# Patient Record
Sex: MALE | Race: WHITE | NOT HISPANIC OR LATINO
[De-identification: names, ages, dates, MRNs, and addresses within clinical notes are randomized per-mention and may not be internally consistent; named-entity substitution may affect disease eponyms.]

---

## 2017-08-28 ENCOUNTER — APPOINTMENT (OUTPATIENT)
Dept: DERMATOLOGY | Facility: CLINIC | Age: 22
End: 2017-08-28
Payer: MEDICARE

## 2017-08-28 VITALS — DIASTOLIC BLOOD PRESSURE: 70 MMHG | SYSTOLIC BLOOD PRESSURE: 118 MMHG

## 2017-08-28 DIAGNOSIS — L70.0 ACNE VULGARIS: ICD-10-CM

## 2017-08-28 DIAGNOSIS — Z87.09 PERSONAL HISTORY OF OTHER DISEASES OF THE RESPIRATORY SYSTEM: ICD-10-CM

## 2017-08-28 DIAGNOSIS — Z87.898 PERSONAL HISTORY OF OTHER SPECIFIED CONDITIONS: ICD-10-CM

## 2017-08-28 DIAGNOSIS — D22.9 MELANOCYTIC NEVI, UNSPECIFIED: ICD-10-CM

## 2017-08-28 PROCEDURE — 99203 OFFICE O/P NEW LOW 30 MIN: CPT | Mod: GC

## 2018-08-20 ENCOUNTER — APPOINTMENT (OUTPATIENT)
Dept: DERMATOLOGY | Facility: CLINIC | Age: 23
End: 2018-08-20
Payer: COMMERCIAL

## 2018-08-20 VITALS — SYSTOLIC BLOOD PRESSURE: 128 MMHG | DIASTOLIC BLOOD PRESSURE: 72 MMHG

## 2018-08-20 DIAGNOSIS — B35.3 TINEA PEDIS: ICD-10-CM

## 2018-08-20 DIAGNOSIS — L85.8 OTHER SPECIFIED EPIDERMAL THICKENING: ICD-10-CM

## 2018-08-20 DIAGNOSIS — L71.9 ROSACEA, UNSPECIFIED: ICD-10-CM

## 2018-08-20 DIAGNOSIS — L81.9 DISORDER OF PIGMENTATION, UNSPECIFIED: ICD-10-CM

## 2018-08-20 PROCEDURE — 99214 OFFICE O/P EST MOD 30 MIN: CPT

## 2018-12-27 ENCOUNTER — APPOINTMENT (OUTPATIENT)
Dept: DERMATOLOGY | Facility: CLINIC | Age: 23
End: 2018-12-27
Payer: COMMERCIAL

## 2018-12-27 DIAGNOSIS — L30.9 DERMATITIS, UNSPECIFIED: ICD-10-CM

## 2018-12-27 PROCEDURE — 99213 OFFICE O/P EST LOW 20 MIN: CPT

## 2019-01-05 ENCOUNTER — TRANSCRIPTION ENCOUNTER (OUTPATIENT)
Age: 24
End: 2019-01-05

## 2019-02-05 ENCOUNTER — RX RENEWAL (OUTPATIENT)
Age: 24
End: 2019-02-05

## 2019-02-21 ENCOUNTER — APPOINTMENT (OUTPATIENT)
Dept: DERMATOLOGY | Facility: CLINIC | Age: 24
End: 2019-02-21

## 2019-02-25 ENCOUNTER — APPOINTMENT (OUTPATIENT)
Dept: CT IMAGING | Facility: IMAGING CENTER | Age: 24
End: 2019-02-25
Payer: COMMERCIAL

## 2019-02-25 ENCOUNTER — OUTPATIENT (OUTPATIENT)
Dept: OUTPATIENT SERVICES | Facility: HOSPITAL | Age: 24
LOS: 1 days | End: 2019-02-25
Payer: COMMERCIAL

## 2019-02-25 DIAGNOSIS — J34.2 DEVIATED NASAL SEPTUM: ICD-10-CM

## 2019-02-25 DIAGNOSIS — Z00.8 ENCOUNTER FOR OTHER GENERAL EXAMINATION: ICD-10-CM

## 2019-02-25 PROCEDURE — 70486 CT MAXILLOFACIAL W/O DYE: CPT | Mod: 26

## 2019-02-25 PROCEDURE — 70486 CT MAXILLOFACIAL W/O DYE: CPT

## 2019-04-01 ENCOUNTER — APPOINTMENT (OUTPATIENT)
Dept: MRI IMAGING | Facility: IMAGING CENTER | Age: 24
End: 2019-04-01
Payer: COMMERCIAL

## 2019-04-01 ENCOUNTER — OUTPATIENT (OUTPATIENT)
Dept: OUTPATIENT SERVICES | Facility: HOSPITAL | Age: 24
LOS: 1 days | End: 2019-04-01
Payer: COMMERCIAL

## 2019-04-01 DIAGNOSIS — J32.1 CHRONIC FRONTAL SINUSITIS: ICD-10-CM

## 2019-04-01 PROCEDURE — A9585: CPT

## 2019-04-01 PROCEDURE — 70553 MRI BRAIN STEM W/O & W/DYE: CPT

## 2019-04-01 PROCEDURE — 70553 MRI BRAIN STEM W/O & W/DYE: CPT | Mod: 26

## 2019-07-24 ENCOUNTER — INBOUND DOCUMENT (OUTPATIENT)
Age: 24
End: 2019-07-24

## 2019-07-24 ENCOUNTER — APPOINTMENT (OUTPATIENT)
Dept: OTOLARYNGOLOGY | Facility: CLINIC | Age: 24
End: 2019-07-24
Payer: COMMERCIAL

## 2019-07-24 VITALS
DIASTOLIC BLOOD PRESSURE: 90 MMHG | HEART RATE: 66 BPM | SYSTOLIC BLOOD PRESSURE: 131 MMHG | HEIGHT: 69 IN | BODY MASS INDEX: 23.7 KG/M2 | WEIGHT: 160 LBS

## 2019-07-24 PROCEDURE — 92567 TYMPANOMETRY: CPT

## 2019-07-24 PROCEDURE — 99204 OFFICE O/P NEW MOD 45 MIN: CPT

## 2019-07-24 PROCEDURE — 92588 EVOKED AUDITORY TST COMPLETE: CPT

## 2019-07-24 PROCEDURE — 92557 COMPREHENSIVE HEARING TEST: CPT

## 2019-07-25 NOTE — REVIEW OF SYSTEMS
[Ear Pain] : ear pain [Ear Itch] : ear itch [Hearing Loss] : hearing loss [Vertigo] : vertigo [Dizziness] : dizziness [Lightheadedness] : lightheadedness [Recurrent Sinus Infections] : recurrent sinus infections [Nasal Congestion] : nasal congestion [Sinus Pain] : sinus pain [Problem Snoring] : problem snoring [Sinus Pressure] : sinus pressure [Heartburn] : heartburn [Anxiety] : anxiety [Negative] : Heme/Lymph [FreeTextEntry1] : headache, fatigue

## 2019-07-25 NOTE — CONSULT LETTER
[FreeTextEntry2] : James Cool MD [FreeTextEntry1] : Dear James,\par \par Thank you very much for your consultation request regarding Charles Burrows.  As you know, he is a pleasant 23-year-old gentleman with a history of fluctuating symmetric high-frequency sensorineural hearing loss.  He was tried on 7 days of high-dose prednisone, but the hearing loss has been unchanged. \par \par Otoscopic exam today is normal.  I reviewed multiple audiograms.  He first was found to have documented hearing loss in 2016, which improved after 7-8 months.  Audiograms from full 2018 and March 2019 showed normal hearing, but beginning in June he noticed new onset ear fullness and tinnitus, and audiogram again demonstrated a bilateral symmetric high-frequency hearing loss.  Recent brain MRI showed no retrocochlear pathology, and he is awaiting a dedicated IAC study.   \par \par I agree with your recommendations for steroids based on a possible diagnosis of autoimmune anterior disease.  We plan to reinitiate high-dose prednisone for 2 weeks and will recheck his hearing at the end of this time.  If there has been improvement I will plan to continue at high-dose for one month, but if there is no change he will begin another taper.  We briefly discussed that he could potentially be a candidate for an immune modulator such as anakinra should there be no response to steroids.  \par \par Thank you once again for the opportunity to participate in your patient's care, and I will continue to keep you updated as to his progress.\par \par Best regards,\par \par Gurmeet Gupta MD\par Otology/Neurotology\par Department of Otolaryngology\par [facility]\par \par \par

## 2019-07-25 NOTE — REASON FOR VISIT
[Initial Consultation] : an initial consultation for [FreeTextEntry2] : hearing loss, ringing in ears

## 2019-07-25 NOTE — DATA REVIEWED
[de-identified] : I personally reviewed the patient's audiogram from today, which shows bilateral HF SNHL with similar thresholds to most recent study from Dr. Cool's office.  Prior audiograms from early 2019 and 2018 show normal hearing, and audiogram from 2015 shows bilateral HF SNHL.\par  [de-identified] : I personally reviewed MRI images and the report, which shows no obvious IAC/retrocochlear pathology, although this was not a dedicated IAC study.\par

## 2019-07-25 NOTE — HISTORY OF PRESENT ILLNESS
[de-identified] : 23M with hearing loss and tinnitus that began late June 2019.  Hearing loss constant, associated fullness.  Took steroids which subjectively improved hearing but fullness persistent. Has history of sinusitis.  Had ear fullness in 2016 with documented hearing loss that improved 7-8 months.  No prior history of recurrent ear infections or ear surgeries.

## 2019-07-29 ENCOUNTER — OUTPATIENT (OUTPATIENT)
Dept: OUTPATIENT SERVICES | Facility: HOSPITAL | Age: 24
LOS: 1 days | End: 2019-07-29
Payer: COMMERCIAL

## 2019-07-29 ENCOUNTER — APPOINTMENT (OUTPATIENT)
Dept: MRI IMAGING | Facility: IMAGING CENTER | Age: 24
End: 2019-07-29
Payer: COMMERCIAL

## 2019-07-29 DIAGNOSIS — J32.1 CHRONIC FRONTAL SINUSITIS: ICD-10-CM

## 2019-07-29 DIAGNOSIS — H91.20 SUDDEN IDIOPATHIC HEARING LOSS, UNSPECIFIED EAR: ICD-10-CM

## 2019-07-29 PROCEDURE — A9585: CPT

## 2019-07-29 PROCEDURE — 70553 MRI BRAIN STEM W/O & W/DYE: CPT

## 2019-07-29 PROCEDURE — 70553 MRI BRAIN STEM W/O & W/DYE: CPT | Mod: 26

## 2019-08-02 ENCOUNTER — APPOINTMENT (OUTPATIENT)
Dept: OTOLARYNGOLOGY | Facility: CLINIC | Age: 24
End: 2019-08-02
Payer: COMMERCIAL

## 2019-08-02 VITALS
SYSTOLIC BLOOD PRESSURE: 135 MMHG | WEIGHT: 160 LBS | DIASTOLIC BLOOD PRESSURE: 78 MMHG | BODY MASS INDEX: 23.7 KG/M2 | HEIGHT: 69 IN | HEART RATE: 101 BPM

## 2019-08-02 PROCEDURE — 99213 OFFICE O/P EST LOW 20 MIN: CPT | Mod: 25

## 2019-08-02 PROCEDURE — 92567 TYMPANOMETRY: CPT

## 2019-08-02 PROCEDURE — 92557 COMPREHENSIVE HEARING TEST: CPT

## 2019-08-02 RX ORDER — FLUTICASONE PROPIONATE 50 UG/1
50 SPRAY, METERED NASAL
Qty: 16 | Refills: 0 | Status: DISCONTINUED | COMMUNITY
Start: 2017-07-24 | End: 2019-08-02

## 2019-08-02 NOTE — HISTORY OF PRESENT ILLNESS
[de-identified] : 23 year old male follow up for hearing loss and tinnitus. States some improvement since last visit but reports left ear feeling clogged this morning and with mild pressure that has since resolved. Reports right ear tinnitus continues. Denies otalgia, otorrhea, recent ear infections, dizziness, vertigo. Reports mild headaches daily lasting a few hours that resolves on its own. Reports still taking prednisone as prescribed. \par \par

## 2019-08-02 NOTE — DATA REVIEWED
[de-identified] : I personally reviewed the patient's audiogram, which shows stable b/l downsloping HL in the high frequencies.\par

## 2019-08-02 NOTE — REASON FOR VISIT
[Subsequent Evaluation] : a subsequent evaluation for [FreeTextEntry2] : follow up for hearing loss and tinnitus

## 2019-08-07 ENCOUNTER — APPOINTMENT (OUTPATIENT)
Dept: CT IMAGING | Facility: CLINIC | Age: 24
End: 2019-08-07
Payer: COMMERCIAL

## 2019-08-07 ENCOUNTER — OUTPATIENT (OUTPATIENT)
Dept: OUTPATIENT SERVICES | Facility: HOSPITAL | Age: 24
LOS: 1 days | End: 2019-08-07
Payer: COMMERCIAL

## 2019-08-07 ENCOUNTER — APPOINTMENT (OUTPATIENT)
Dept: OTOLARYNGOLOGY | Facility: CLINIC | Age: 24
End: 2019-08-07
Payer: COMMERCIAL

## 2019-08-07 DIAGNOSIS — Z00.8 ENCOUNTER FOR OTHER GENERAL EXAMINATION: ICD-10-CM

## 2019-08-07 PROCEDURE — 74176 CT ABD & PELVIS W/O CONTRAST: CPT | Mod: 26

## 2019-08-07 PROCEDURE — 92557 COMPREHENSIVE HEARING TEST: CPT

## 2019-08-07 PROCEDURE — 74176 CT ABD & PELVIS W/O CONTRAST: CPT

## 2019-08-07 PROCEDURE — 92567 TYMPANOMETRY: CPT

## 2019-08-07 PROCEDURE — 99213 OFFICE O/P EST LOW 20 MIN: CPT | Mod: 25

## 2019-08-07 NOTE — HISTORY OF PRESENT ILLNESS
[de-identified] : reports hearing loss subjectively improved since last visit, no side effects from steroids.  Tinnitus remains intermittent.  Does note today that he was told he had high frequency hearing loss in childhood.  Also states mother had hearing loss in 30s for which she wore hearing aids, and grandmother had Meniere's disease.

## 2019-08-07 NOTE — CONSULT LETTER
[FreeTextEntry2] : James Cool MD [FreeTextEntry1] : Dear James,\par \par Charles presents for followup.  As you know, he is a very pleasant 23-year-old gentleman with bilateral high-frequency hearing loss that appears fluctuating based on prior audiograms.  Since completion of his most recent two-week course of steroids, he has not noticed significant hearing change, and his newest audiogram today again shows a stable high-frequency bilateral sensorineural hearing loss.  Recent MRI showed no retrocochlear pathology.\par \par While the apparent fluctuations in his bilateral hearing loss do suggest the possibility of autoimmune inner ear disease, the lack of response to steroids and the fact that the hearing loss is symmetric would argue against this.  Charles also notes that he was told as a child that he had high-frequency hearing loss, which would further argue against a diagnosis of AIED.  I asked that he attempt to obtain older medical records from his pediatrician documenting his hearing status before we make a decision regarding the possible option of immunosuppressive therapy.  At this point I would not be inclined to recommend immunosuppressive treatment for him unless we see further evidence of hearing fluctuation moving forward.  For now he will follow up in 2 months.\par \par Thank you once again for the opportunity to participate in your patient's care, and I will continue to keep you updated as to his progress.\par \par Best regards,\par \par Gurmeet Gupta MD\par Otology/Neurotology\par Department of Otolaryngology\par [facility]

## 2019-08-07 NOTE — DATA REVIEWED
[de-identified] : I personally reviewed the patient's audiogram, which again shows a stable b/l HF SNHL.\par

## 2019-08-08 ENCOUNTER — APPOINTMENT (OUTPATIENT)
Dept: UROLOGY | Facility: CLINIC | Age: 24
End: 2019-08-08
Payer: COMMERCIAL

## 2019-08-08 DIAGNOSIS — R31.29 OTHER MICROSCOPIC HEMATURIA: ICD-10-CM

## 2019-08-08 PROCEDURE — 99203 OFFICE O/P NEW LOW 30 MIN: CPT

## 2019-08-08 NOTE — ASSESSMENT
[FreeTextEntry1] : ? passed stone ? GI issue.  Advised increased po fluids analgesics prn. Will send urine for microscopic eval / cytology

## 2019-08-08 NOTE — REVIEW OF SYSTEMS
[Loss Of Hearing] : hearing loss [Abdominal Pain] : abdominal pain [Negative] : Heme/Lymph [Vomiting] : no vomiting

## 2019-08-08 NOTE — PHYSICAL EXAM
[General Appearance - Well Nourished] : well nourished [General Appearance - Well Developed] : well developed [General Appearance - In No Acute Distress] : no acute distress [Abdomen Soft] : soft [Costovertebral Angle Tenderness] : no ~M costovertebral angle tenderness [Penis Abnormality] : normal circumcised penis [Urinary Bladder Findings] : the bladder was normal on palpation [Testes Tenderness] : no tenderness of the testes [Testes Mass (___cm)] : there were no testicular masses [Oriented To Time, Place, And Person] : oriented to person, place, and time [FreeTextEntry1] : mild llq tend, no rebou nd or guarding

## 2019-08-08 NOTE — HISTORY OF PRESENT ILLNESS
[Hematuria - Gross] : gross hematuria [Abdominal Pain] : abdominal pain [9] : 9 [Lower Abdomen] : lower abdomen [Sudden] : sudden [Intermittent] : intermittently [___ Minute(s)] : last for [unfilled] minute(s) [Daily] : occur daily [Severe] : severe [Improving] : improving [None] : No relieving factors are noted [FreeTextEntry1] : 3-4 day hx of LLQ pain, sharp stabbing.  Intermittent, No n/v, No f/c No gross hematuria.  Saw pmd had ct and u/a.

## 2019-08-09 LAB
APPEARANCE: ABNORMAL
BACTERIA: NEGATIVE
BILIRUBIN URINE: NEGATIVE
BLOOD URINE: NEGATIVE
COLOR: YELLOW
GLUCOSE QUALITATIVE U: NEGATIVE
HYALINE CASTS: 5 /LPF
KETONES URINE: NEGATIVE
LEUKOCYTE ESTERASE URINE: NEGATIVE
MICROSCOPIC-UA: NORMAL
NITRITE URINE: NEGATIVE
PH URINE: 6.5
PROTEIN URINE: ABNORMAL
RED BLOOD CELLS URINE: 5 /HPF
SPECIFIC GRAVITY URINE: 1.02
SQUAMOUS EPITHELIAL CELLS: 13 /HPF
URINE COMMENTS: NORMAL
UROBILINOGEN URINE: NORMAL
WHITE BLOOD CELLS URINE: 6 /HPF

## 2019-08-12 LAB — URINE CYTOLOGY: NORMAL

## 2019-09-12 ENCOUNTER — APPOINTMENT (OUTPATIENT)
Dept: OTOLARYNGOLOGY | Facility: CLINIC | Age: 24
End: 2019-09-12
Payer: COMMERCIAL

## 2019-09-12 VITALS
DIASTOLIC BLOOD PRESSURE: 85 MMHG | BODY MASS INDEX: 23.7 KG/M2 | HEART RATE: 89 BPM | WEIGHT: 160 LBS | SYSTOLIC BLOOD PRESSURE: 128 MMHG | HEIGHT: 69 IN

## 2019-09-12 PROCEDURE — 92567 TYMPANOMETRY: CPT

## 2019-09-12 PROCEDURE — 92557 COMPREHENSIVE HEARING TEST: CPT

## 2019-09-12 PROCEDURE — 99213 OFFICE O/P EST LOW 20 MIN: CPT | Mod: 25

## 2019-09-12 NOTE — REASON FOR VISIT
[Subsequent Evaluation] : a subsequent evaluation for [FreeTextEntry2] : patient states is here to follow up for tinnitus and patient states pressure behind both ears got worst for the past 2 weeks and also both ears feels clogged

## 2019-12-05 ENCOUNTER — APPOINTMENT (OUTPATIENT)
Dept: OTOLARYNGOLOGY | Facility: CLINIC | Age: 24
End: 2019-12-05
Payer: COMMERCIAL

## 2019-12-05 VITALS
DIASTOLIC BLOOD PRESSURE: 79 MMHG | HEIGHT: 69 IN | WEIGHT: 160 LBS | SYSTOLIC BLOOD PRESSURE: 133 MMHG | HEART RATE: 100 BPM | BODY MASS INDEX: 23.7 KG/M2

## 2019-12-05 PROCEDURE — 92557 COMPREHENSIVE HEARING TEST: CPT

## 2019-12-05 PROCEDURE — 31231 NASAL ENDOSCOPY DX: CPT

## 2019-12-05 PROCEDURE — 99214 OFFICE O/P EST MOD 30 MIN: CPT | Mod: 25

## 2019-12-05 PROCEDURE — 92567 TYMPANOMETRY: CPT

## 2019-12-05 NOTE — HISTORY OF PRESENT ILLNESS
[de-identified] : 23 y/o male with longstanding history of recurrent episodes of sinus pressure, forehead, cheeks, in between eyes and nasal congestion L>R. He notes a sinus infection every 2-3 months- sometimes treated with oral abx but not always- has had 3-4 courses of oral abx within the past 12 months for sinus infections. He notes sinus pressure continues in between infections but has periods where it completely resolves. He's currently on Flonase daily for the past 3-4 months. Prior allergy testing was negative. \par + runny nose\par \par Also with 5-6 month history of bilat. ear blockage and pressure which comes and goes but is very bothersome. Has been followed by Dr. Gupta for this and for hx of SNHL. Was treated with Prednisone course for possible autoimmune hearing loss which helped with blockage/pressure but not durable. Also given clonazepam for tinnitus which helps.\par + tone tinnitus AD x 2-3 months\par + pain AU- when ears full/blocked\par No drainage AU or vertigo.

## 2019-12-05 NOTE — PROCEDURE
[FreeTextEntry6] : Procedure performed: Nasal Endoscopy- Diagnostic\par Pre / post -procedure indication(s): nasal congestion\par Verbal and/or written consent obtained from patient\par Scope #: 19,  flexible fiber optic telescope used\par The scope was introduced in the nasal passage between the middle and inferior turbinates to exam the inferior portion of the middle meatus and the fontanelle, as well as the maxillary ostia.  Next, the scope was passed medically and posteriorly to the middle turbinates to examine the sphenoethmoid recess and the superior turbinate region.\par Upon visualization the finders are as follows:\par Nasal Septum: sigmoidal septal deviation \par B/L: Mucosa: pink and moist, edematous Mucous: scant, Polyp: not seen, Inferior Turbinate, Middle and Superior Turbinate: inferior turbinate hypertrophy Inferior Meatus: narrow, Middle Meatus: narrow, Super Meatus:normal, Sphenoethmoidal Recess: clear.  Eustachian tube clear. \par The patient tolerated the procedure well\par

## 2019-12-05 NOTE — ASSESSMENT
[FreeTextEntry1] : Pt with chronic sinusitis and nasal congestion with is refractory to extensive medical management. On exam, sigmoidal septal deviation, inferior turbinate hypertrophy and significant mucosal edema. \par - CT images reviewed\par - We will proceed with a regimen of decongestants, antibiotics and irrigation to try to break the cycle of inflammation and obstruction. \par - Risks benefits and alternatives of endoscopic sinus surgery with possible image guidance, septoplasty and bilateral inferior turbinate reduction discussed with patient at length. Risks discussed include but were not limited to bleeding, infection, persistent symptoms, scarring, injury to the skull base and brain and CSF leak, injury to orbit, crusting, septal hematoma, septal perforation results in whistling, crusting and bleeding as well as continued nasal obstruction etc. were discussed. 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 Also discussed behavioral techniques and environmental controls for improving function . They will follow up as needed or if hearing gets worse.\par - Discussed pathophysiology of tinnitus and usual prognosis and treatment. Will try masking techniques. If persist and bothersome can try pitch therapy, or acupuncture.\par \par \par I personally saw and examined KAYLEY DONG in detail. I spoke to FRIDA Cerna regarding the assessment and plan of care.  I preformed the procedures and I reviewed the above assessment and plan of care, and agree. I have made changes in changes in the body of the note where appropriate.\par \par \par \par

## 2019-12-05 NOTE — CONSULT LETTER
[FreeTextEntry1] : Dear Dr. CASSANDRA RAO \par I had the pleasure of evaluating your patient KAYLEY DONG  thank you for allowing us to participate in their care. please see full note detailing our visit below.\par If you have any questions, please do not hesitate to call me and I would be happy to discuss further. \par \par Dixon Joseph M.D.\par Attending Physician,  \par Department of Otolaryngology - Head and Neck Surgery\par [facility] \par Office: [phone]\par Fax: [phone]\par

## 2020-01-06 ENCOUNTER — APPOINTMENT (OUTPATIENT)
Dept: OTOLARYNGOLOGY | Facility: CLINIC | Age: 25
End: 2020-01-06
Payer: COMMERCIAL

## 2020-01-06 VITALS
HEART RATE: 77 BPM | WEIGHT: 165 LBS | DIASTOLIC BLOOD PRESSURE: 77 MMHG | BODY MASS INDEX: 24.44 KG/M2 | HEIGHT: 69 IN | SYSTOLIC BLOOD PRESSURE: 131 MMHG

## 2020-01-06 PROCEDURE — 31231 NASAL ENDOSCOPY DX: CPT

## 2020-01-06 PROCEDURE — 99214 OFFICE O/P EST MOD 30 MIN: CPT | Mod: 25

## 2020-01-06 NOTE — HISTORY OF PRESENT ILLNESS
[de-identified] : 25 y/o male with longstanding history of recurrent episodes of sinus pressure, forehead, cheeks, in between eyes and nasal congestion L>R. He notes a sinus infection every 2-3 months- sometimes treated with oral abx but not always- has had 3-4 courses of oral abx within the past 12 months for sinus infections. He notes sinus pressure continues in between infections but has periods where it completely resolves. He's currently on Flonase daily for the past 3-4 months. Prior allergy testing was negative. At last visit, started on sinusitis regimen which he completed with some improvement but not durable. He is currently scheduled for pansinus IG FESS, septoplasty and bilat. turbinate reduction 3/18/20.\par + runny nose\par \par Also with 5-6 month history of bilat. ear blockage and pressure which comes and goes but is very bothersome. Has been followed by Dr. Gupta for this and for hx of SNHL. He notes some improvement in ear pressure while on oral steroids. \par + tone tinnitus AD x 2-3 months\par No drainage AU or vertigo.

## 2020-01-06 NOTE — ASSESSMENT
[FreeTextEntry1] : Pt with chronic sinusitis and nasal congestion with is refractory to extensive medical management. On exam, sigmoidal septal deviation, inferior turbinate hypertrophy and significant mucosal edema. \par - CT images reviewed\par - Will repeat CT sinus as last one was over 1 year ago\par - Risks benefits and alternatives of endoscopic sinus surgery with possible image guidance, septoplasty and bilateral inferior turbinate reduction discussed with patient at length. Risks discussed include but were not limited to bleeding, infection, persistent symptoms, scarring, injury to the skull base and brain and CSF leak, injury to orbit, crusting, septal hematoma, septal perforation results in whistling, crusting and bleeding as well as continued nasal obstruction etc. were discussed. 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 Also discussed behavioral techniques and environmental controls for improving function . They will follow up as needed or if hearing gets worse.\par - Discussed pathophysiology of tinnitus and usual prognosis and treatment. Will try masking techniques. If persist and bothersome can try pitch therapy, or acupuncture.\par \par \par \par \par \par I personally saw and examined KAYLEY DONG in detail. I spoke to FRIDA Cerna regarding the assessment and plan of care.  I preformed the procedures and I reviewed the above assessment and plan of care, and agree. I have made changes in changes in the body of the note where appropriate.\par \par

## 2020-01-06 NOTE — CONSULT LETTER
[FreeTextEntry1] : Dear Dr. CASSANDRA RAO \par I had the pleasure of evaluating your patient KAYLEY DONG  thank you for allowing us to participate in their care. please see full note detailing our visit below.\par If you have any questions, please do not hesitate to call me and I would be happy to discuss further. \par \par Dixon Joseph M.D.\par Attending Physician,  \par Department of Otolaryngology - Head and Neck Surgery\[facility] \par Office: [phone]\par Fax: [phone]\par

## 2020-01-21 ENCOUNTER — RX RENEWAL (OUTPATIENT)
Age: 25
End: 2020-01-21

## 2020-02-09 ENCOUNTER — FORM ENCOUNTER (OUTPATIENT)
Age: 25
End: 2020-02-09

## 2020-02-10 ENCOUNTER — OUTPATIENT (OUTPATIENT)
Dept: OUTPATIENT SERVICES | Facility: HOSPITAL | Age: 25
LOS: 1 days | End: 2020-02-10
Payer: COMMERCIAL

## 2020-02-10 ENCOUNTER — APPOINTMENT (OUTPATIENT)
Dept: CT IMAGING | Facility: IMAGING CENTER | Age: 25
End: 2020-02-10
Payer: COMMERCIAL

## 2020-02-10 DIAGNOSIS — J32.4 CHRONIC PANSINUSITIS: ICD-10-CM

## 2020-02-10 PROCEDURE — 70486 CT MAXILLOFACIAL W/O DYE: CPT | Mod: 26

## 2020-02-10 PROCEDURE — 70486 CT MAXILLOFACIAL W/O DYE: CPT

## 2020-02-13 ENCOUNTER — NON-APPOINTMENT (OUTPATIENT)
Age: 25
End: 2020-02-13

## 2020-03-11 ENCOUNTER — OUTPATIENT (OUTPATIENT)
Dept: OUTPATIENT SERVICES | Facility: HOSPITAL | Age: 25
LOS: 1 days | End: 2020-03-11
Payer: COMMERCIAL

## 2020-03-11 VITALS
OXYGEN SATURATION: 100 % | RESPIRATION RATE: 16 BRPM | WEIGHT: 167.99 LBS | DIASTOLIC BLOOD PRESSURE: 79 MMHG | TEMPERATURE: 98 F | HEART RATE: 74 BPM | HEIGHT: 69 IN | SYSTOLIC BLOOD PRESSURE: 132 MMHG

## 2020-03-11 DIAGNOSIS — J32.4 CHRONIC PANSINUSITIS: ICD-10-CM

## 2020-03-11 DIAGNOSIS — J34.3 HYPERTROPHY OF NASAL TURBINATES: ICD-10-CM

## 2020-03-11 DIAGNOSIS — J34.2 DEVIATED NASAL SEPTUM: ICD-10-CM

## 2020-03-11 DIAGNOSIS — Z01.818 ENCOUNTER FOR OTHER PREPROCEDURAL EXAMINATION: ICD-10-CM

## 2020-03-11 LAB
HCT VFR BLD CALC: 46.2 % — SIGNIFICANT CHANGE UP (ref 39–50)
HGB BLD-MCNC: 15.2 G/DL — SIGNIFICANT CHANGE UP (ref 13–17)
MCHC RBC-ENTMCNC: 29.5 PG — SIGNIFICANT CHANGE UP (ref 27–34)
MCHC RBC-ENTMCNC: 32.9 GM/DL — SIGNIFICANT CHANGE UP (ref 32–36)
MCV RBC AUTO: 89.7 FL — SIGNIFICANT CHANGE UP (ref 80–100)
NRBC # BLD: 0 /100 WBCS — SIGNIFICANT CHANGE UP (ref 0–0)
PLATELET # BLD AUTO: 276 K/UL — SIGNIFICANT CHANGE UP (ref 150–400)
RBC # BLD: 5.15 M/UL — SIGNIFICANT CHANGE UP (ref 4.2–5.8)
RBC # FLD: 12.4 % — SIGNIFICANT CHANGE UP (ref 10.3–14.5)
WBC # BLD: 8.56 K/UL — SIGNIFICANT CHANGE UP (ref 3.8–10.5)
WBC # FLD AUTO: 8.56 K/UL — SIGNIFICANT CHANGE UP (ref 3.8–10.5)

## 2020-03-11 PROCEDURE — 85027 COMPLETE CBC AUTOMATED: CPT

## 2020-03-11 PROCEDURE — G0463: CPT

## 2020-03-11 RX ORDER — LIDOCAINE HCL 20 MG/ML
0.2 VIAL (ML) INJECTION ONCE
Refills: 0 | Status: DISCONTINUED | OUTPATIENT
Start: 2020-03-18 | End: 2020-04-02

## 2020-03-11 RX ORDER — SODIUM CHLORIDE 9 MG/ML
3 INJECTION INTRAMUSCULAR; INTRAVENOUS; SUBCUTANEOUS EVERY 8 HOURS
Refills: 0 | Status: DISCONTINUED | OUTPATIENT
Start: 2020-03-18 | End: 2020-04-02

## 2020-03-11 NOTE — H&P PST ADULT - HISTORY OF PRESENT ILLNESS
23 yo M c/o recurrent sinusitis, nasal congestion, tinnitus x 10 y. Pt had worsening of symptoms x one year- had ENT evaluation revealed deviated nasal septum, hypertrophy of nasal turbinates. Pt scheduled for FEES with [employer]fusion septoplasty, bilateral inferior turbinate reduction on 03/18/2020.

## 2020-03-11 NOTE — H&P PST ADULT - NSICDXPASTSURGICALHX_GEN_ALL_CORE_FT
PAST SURGICAL HISTORY:  Blocked tear duct in infant Probe & irrigation    History of intestinal obstruction s/p repair 1993

## 2020-03-12 ENCOUNTER — APPOINTMENT (OUTPATIENT)
Dept: OTOLARYNGOLOGY | Facility: CLINIC | Age: 25
End: 2020-03-12

## 2020-03-17 ENCOUNTER — TRANSCRIPTION ENCOUNTER (OUTPATIENT)
Age: 25
End: 2020-03-17

## 2020-03-17 RX ORDER — OXYCODONE HYDROCHLORIDE 5 MG/1
5 TABLET ORAL ONCE
Refills: 0 | Status: DISCONTINUED | OUTPATIENT
Start: 2020-03-18 | End: 2020-03-18

## 2020-03-17 RX ORDER — SODIUM CHLORIDE 9 MG/ML
1000 INJECTION, SOLUTION INTRAVENOUS
Refills: 0 | Status: DISCONTINUED | OUTPATIENT
Start: 2020-03-18 | End: 2020-04-02

## 2020-03-17 RX ORDER — ONDANSETRON 8 MG/1
4 TABLET, FILM COATED ORAL ONCE
Refills: 0 | Status: DISCONTINUED | OUTPATIENT
Start: 2020-03-18 | End: 2020-04-02

## 2020-03-18 ENCOUNTER — RESULT REVIEW (OUTPATIENT)
Age: 25
End: 2020-03-18

## 2020-03-18 ENCOUNTER — OUTPATIENT (OUTPATIENT)
Dept: OUTPATIENT SERVICES | Facility: HOSPITAL | Age: 25
LOS: 1 days | End: 2020-03-18
Payer: COMMERCIAL

## 2020-03-18 ENCOUNTER — APPOINTMENT (OUTPATIENT)
Dept: OTOLARYNGOLOGY | Facility: HOSPITAL | Age: 25
End: 2020-03-18

## 2020-03-18 VITALS
TEMPERATURE: 98 F | HEART RATE: 84 BPM | OXYGEN SATURATION: 95 % | DIASTOLIC BLOOD PRESSURE: 56 MMHG | SYSTOLIC BLOOD PRESSURE: 107 MMHG | RESPIRATION RATE: 18 BRPM

## 2020-03-18 VITALS
DIASTOLIC BLOOD PRESSURE: 78 MMHG | OXYGEN SATURATION: 98 % | RESPIRATION RATE: 16 BRPM | TEMPERATURE: 99 F | HEART RATE: 100 BPM | HEIGHT: 69 IN | SYSTOLIC BLOOD PRESSURE: 119 MMHG | WEIGHT: 167.99 LBS

## 2020-03-18 DIAGNOSIS — J34.2 DEVIATED NASAL SEPTUM: ICD-10-CM

## 2020-03-18 DIAGNOSIS — Z87.19 PERSONAL HISTORY OF OTHER DISEASES OF THE DIGESTIVE SYSTEM: Chronic | ICD-10-CM

## 2020-03-18 DIAGNOSIS — H04.559 ACQUIRED STENOSIS OF UNSPECIFIED NASOLACRIMAL DUCT: Chronic | ICD-10-CM

## 2020-03-18 DIAGNOSIS — J32.4 CHRONIC PANSINUSITIS: ICD-10-CM

## 2020-03-18 DIAGNOSIS — J34.3 HYPERTROPHY OF NASAL TURBINATES: ICD-10-CM

## 2020-03-18 DIAGNOSIS — J32.9 CHRONIC SINUSITIS, UNSPECIFIED: ICD-10-CM

## 2020-03-18 DIAGNOSIS — Z01.818 ENCOUNTER FOR OTHER PREPROCEDURAL EXAMINATION: ICD-10-CM

## 2020-03-18 PROCEDURE — 61782 SCAN PROC CRANIAL EXTRA: CPT

## 2020-03-18 PROCEDURE — 30520 REPAIR OF NASAL SEPTUM: CPT

## 2020-03-18 PROCEDURE — 31276 NSL/SINS NDSC FRNT TISS RMVL: CPT | Mod: 50

## 2020-03-18 PROCEDURE — 88300 SURGICAL PATH GROSS: CPT

## 2020-03-18 PROCEDURE — 31288 NASAL/SINUS ENDOSCOPY SURG: CPT | Mod: 50

## 2020-03-18 PROCEDURE — 88300 SURGICAL PATH GROSS: CPT | Mod: 26

## 2020-03-18 PROCEDURE — 88304 TISSUE EXAM BY PATHOLOGIST: CPT | Mod: 26

## 2020-03-18 PROCEDURE — 30140 RESECT INFERIOR TURBINATE: CPT | Mod: 50

## 2020-03-18 PROCEDURE — 31253 NSL/SINS NDSC TOTAL: CPT | Mod: 50

## 2020-03-18 PROCEDURE — 31255 NSL/SINS NDSC W/TOT ETHMDCT: CPT | Mod: 50

## 2020-03-18 PROCEDURE — 88304 TISSUE EXAM BY PATHOLOGIST: CPT

## 2020-03-18 PROCEDURE — 31267 ENDOSCOPY MAXILLARY SINUS: CPT | Mod: 50

## 2020-03-18 RX ORDER — AMOXICILLIN 250 MG/5ML
1 SUSPENSION, RECONSTITUTED, ORAL (ML) ORAL
Qty: 0 | Refills: 0 | DISCHARGE

## 2020-03-18 NOTE — ASU DISCHARGE PLAN (ADULT/PEDIATRIC) - CARE PROVIDER_API CALL
Dixon Joseph)  Otolaryngology  [address] [place] [address], [address]  [place] [address]  Phone: [phone]  Fax: [phone]  Scheduled Appointment: 03/19/2020

## 2020-03-18 NOTE — BRIEF OPERATIVE NOTE - NSICDXBRIEFPROCEDURE_GEN_ALL_CORE_FT
PROCEDURES:  Reduction, inferior nasal turbinate 18-Mar-2020 09:39:00  Abimael Hernandez  Endoscopic sinus surgery 18-Mar-2020 09:38:52  Abimael Hernandez  Septoplasty 18-Mar-2020 09:38:40  Abimael Hernandez

## 2020-03-18 NOTE — ASU DISCHARGE PLAN (ADULT/PEDIATRIC) - NURSING INSTRUCTIONS
Next dose of Tylenol will be on or after 1:15 PM today/tonight, If needed for pain. Your first dose of Tylenol was given at 7:15 AM. Do not exceed more than 4000mg of Tylenol in one 24 hour setting.

## 2020-03-18 NOTE — BRIEF OPERATIVE NOTE - NSICDXBRIEFPREOP_GEN_ALL_CORE_FT
PRE-OP DIAGNOSIS:  Deviated nasal septum 18-Mar-2020 09:39:22  Abimael Hernandez  Chronic sinusitis 18-Mar-2020 09:39:14  Abimael Hernandez

## 2020-03-18 NOTE — ASU DISCHARGE PLAN (ADULT/PEDIATRIC) - ASU DC SPECIAL INSTRUCTIONSFT
No nose blowing for 2 weeks, cough/sneeze through an open mouth   No straining for two weeks  Complete antibiotics and steroids as directed on prescription  Pain medication as needed - do not drive, make decisions or operate machinery on pain meds. if constipates take stool softener, do not strain.   you can start using nasal wash tomorrow, 4 times a day if possible  I will see you in one week and clean everything out/ take out splints

## 2020-03-18 NOTE — BRIEF OPERATIVE NOTE - NSICDXBRIEFPOSTOP_GEN_ALL_CORE_FT
POST-OP DIAGNOSIS:  Chronic sinusitis 18-Mar-2020 09:39:37  Abimael Hernandez  Deviated nasal septum 18-Mar-2020 09:39:30  Abimael Hernandez

## 2020-03-20 LAB — SURGICAL PATHOLOGY STUDY: SIGNIFICANT CHANGE UP

## 2020-03-26 ENCOUNTER — APPOINTMENT (OUTPATIENT)
Dept: OTOLARYNGOLOGY | Facility: CLINIC | Age: 25
End: 2020-03-26
Payer: COMMERCIAL

## 2020-03-26 VITALS
SYSTOLIC BLOOD PRESSURE: 121 MMHG | BODY MASS INDEX: 24.44 KG/M2 | HEART RATE: 74 BPM | HEIGHT: 69 IN | TEMPERATURE: 95.7 F | WEIGHT: 165 LBS | DIASTOLIC BLOOD PRESSURE: 75 MMHG

## 2020-03-26 PROBLEM — J32.9 CHRONIC SINUSITIS, UNSPECIFIED: Chronic | Status: ACTIVE | Noted: 2020-03-11

## 2020-03-26 PROBLEM — Q41.9: Chronic | Status: ACTIVE | Noted: 2020-03-11

## 2020-03-26 PROCEDURE — 99024 POSTOP FOLLOW-UP VISIT: CPT

## 2020-03-26 PROCEDURE — 31237 NSL/SINS NDSC SURG BX POLYPC: CPT | Mod: 50,58

## 2020-03-26 NOTE — END OF VISIT
[FreeTextEntry3] : I personally saw and examined KAYLEY DONG in detail. I spoke to FRIDA Ocasio regarding the assessment and plan of care.  I preformed the procedures and I reviewed the above assessment and plan of care, and agree. I have made changes in changes in the body of the note where appropriate.\par \par

## 2020-03-26 NOTE — ASSESSMENT
[FreeTextEntry1] : Pt with chronic sinusitis and nasal congestion with is refractory to extensive medical management. On exam, sigmoidal septal deviation, inferior turbinate hypertrophy and significant mucosal edema. with thin skull base \par patient follows up 1 week status post ESS, septoplasty with turbs. splints were removed and the patient was debrided bilaterally with rigid suction as a result of nasal crusting. breathing much improved after detriment. Patient should continue to avoid nose blowing, heavy lifting or exercising, and should start a regimen of over the counter nasal saline spray for moisturization of the nasal cavities\par will follow up to assure no scarring and keep sinuses open\par

## 2020-03-26 NOTE — PROCEDURE
[FreeTextEntry6] : procedure - bilateral endoscopic nasal  debridement\par splints removed\par Bilateral nasal cavities inspected with #0 ridged sinus endoscope. septum appeared midline and turbinates well reduced. bilateral middle meatuses were explored and suction and agitator were used to open ostiums and open any forming scar bands. all sinuses were explored and open at end of procedure\par

## 2020-03-26 NOTE — PHYSICAL EXAM
[Normal] : normal appearance, well groomed, well nourished, and in no acute distress [de-identified] : crusting

## 2020-03-26 NOTE — HISTORY OF PRESENT ILLNESS
[de-identified] : S/p Septo / turbs / ESS on 3/18/20\par Bloody ooze is decreasing.  No active bleeding.  Pos congestion with mild sinus pressure.  Not using any nasal sprays or saline.  No f/c/s

## 2020-04-06 ENCOUNTER — APPOINTMENT (OUTPATIENT)
Dept: OTOLARYNGOLOGY | Facility: CLINIC | Age: 25
End: 2020-04-06
Payer: COMMERCIAL

## 2020-04-06 VITALS
HEIGHT: 69 IN | SYSTOLIC BLOOD PRESSURE: 109 MMHG | WEIGHT: 165 LBS | BODY MASS INDEX: 24.44 KG/M2 | DIASTOLIC BLOOD PRESSURE: 71 MMHG | HEART RATE: 81 BPM

## 2020-04-06 PROCEDURE — 99024 POSTOP FOLLOW-UP VISIT: CPT

## 2020-04-06 PROCEDURE — 31237 NSL/SINS NDSC SURG BX POLYPC: CPT | Mod: 50,58

## 2020-04-06 NOTE — ASSESSMENT
[FreeTextEntry1] : Pt with chronic sinusitis and nasal congestion with is refractory to extensive medical management. On exam, sigmoidal septal deviation, inferior turbinate hypertrophy and significant mucosal edema. with thin skull base \par patient follows up status post ESS, septoplasty with turbs 3/18/2020. Patient was debrided bilaterally with rigid suction as a result of nasal crusting. breathing much improved after detriment. \par over the counter nasal saline spray for moisturization of the nasal cavities\par will follow up to assure no scarring and keep sinuses open\par

## 2020-04-06 NOTE — HISTORY OF PRESENT ILLNESS
[de-identified] : S/p Septo / turbs / ESS on 3/18/20\par Bloody ooze is decreasing.  No active bleeding.  no more congestion, does have some mild sinus pressure.  Not using any nasal sprays or saline.  No f/c/s

## 2020-04-06 NOTE — PHYSICAL EXAM
[Normal] : normal appearance, well groomed, well nourished, and in no acute distress [de-identified] : crusting

## 2020-04-26 ENCOUNTER — MESSAGE (OUTPATIENT)
Age: 25
End: 2020-04-26

## 2020-04-27 ENCOUNTER — APPOINTMENT (OUTPATIENT)
Dept: OTOLARYNGOLOGY | Facility: CLINIC | Age: 25
End: 2020-04-27
Payer: COMMERCIAL

## 2020-04-27 VITALS
HEIGHT: 69 IN | DIASTOLIC BLOOD PRESSURE: 76 MMHG | SYSTOLIC BLOOD PRESSURE: 121 MMHG | HEART RATE: 63 BPM | WEIGHT: 165 LBS | BODY MASS INDEX: 24.44 KG/M2

## 2020-04-27 PROCEDURE — 31575 DIAGNOSTIC LARYNGOSCOPY: CPT | Mod: 79

## 2020-04-27 PROCEDURE — 92567 TYMPANOMETRY: CPT | Mod: 79

## 2020-04-27 PROCEDURE — 92557 COMPREHENSIVE HEARING TEST: CPT | Mod: 79

## 2020-04-27 PROCEDURE — 99214 OFFICE O/P EST MOD 30 MIN: CPT | Mod: 24

## 2020-04-27 PROCEDURE — 31237 NSL/SINS NDSC SURG BX POLYPC: CPT | Mod: 50,58

## 2020-04-27 NOTE — PHYSICAL EXAM
[Normal] : normal appearance, well groomed, well nourished, and in no acute distress [de-identified] : crusting

## 2020-04-27 NOTE — ASSESSMENT
[FreeTextEntry1] : Pt with chronic sinusitis and nasal congestion with is refractory to extensive medical management. On exam, sigmoidal septal deviation, inferior turbinate hypertrophy and significant mucosal edema. with thin skull base \par patient follows up status post ESS, septoplasty with turbs 3/18/2020. Patient was debrided bilaterally with rigid suction as a result of nasal crusting. breathing much improved after detriment. \par over the counter nasal saline spray for moisturization of the nasal cavities\par will follow up to assure no scarring and keep sinuses open\par \par \par pt with Globus and throat clearing with significant laryngeal reflux on exam. This is the most probable cause at this point. will treat for laryngeal reflux and consider other treatments if refractory\par will proceed to start lifestyle regiment to reduce overproduction of acid and reduce laryngeal reflux including avoiding caffein, alcohol, eating before bed, spicy and fatty foods, and head elevation at night etc. Handout detailing regiment also given\par PPI QD\par \par \par pt with hearing loss, feels a bit better, less pressure s/p procedure, audio roughly stable long term, a bit better than last visit with normal tymps \par nasal Tx, monitor b/l SNHL high freq\par \par \par \par

## 2020-04-27 NOTE — HISTORY OF PRESENT ILLNESS
[de-identified] : S/p Septo / turbs / ESS on 3/18/20\par Bloody ooze is decreasing.  No active bleeding.  no more congestion, does have some mild sinus pressure.  Not using using Astelin.  No f/c/s\par no more antihistamines \par intermittent nasal washes, but gets in the ear \par got some pressure in the ear started 10 months ago \par was constant but now is getting better intermittent post op\par \par feels has some globus worse in the Am - years intermittent\par mild sore throat \par no problem with speaking or swallowing

## 2020-04-27 NOTE — CONSULT LETTER
[FreeTextEntry1] : Dear Dr. CASSANDRA RAO \par I had the pleasure of evaluating your patient KAYLEY DONG, thank you for allowing us to participate in their care. please see full note detailing our visit below.\par If you have any questions, please do not hesitate to call me and I would be happy to discuss further. \par \par Dixon Joseph M.D.\par Attending Physician,  \par Department of Otolaryngology - Head and Neck Surgery\par [facility] \par Office: [phone]\par Fax: [phone]\par \par

## 2020-04-27 NOTE — PROCEDURE
[FreeTextEntry6] : procedure - bilateral endoscopic nasal  debridement\par Bilateral nasal cavities inspected with #0 ridged sinus endoscope. septum appeared midline and turbinates well reduced. bilateral middle meatuses were explored and suction and agitator were used to open ostiums and open any forming scar bands. all sinuses were explored and open at end of procedure\par \par  [de-identified] : Procedure performed: laryngeal Endoscopy- Diagnostic\par Pre-op/post op indication: dysphonia\par Verbal and/or written consent obtained from patient, Patient was unable to cooperate with mirror\par Scope #: 3, flexible fiber optic telescope used \par Scope was introduced through the nose passed on the floor of the nose to the nasopharynx and then followed down the soft palate to the lower pharynx. The tongue Base, Larynx, Hypopharynx were examined. Base of tongue was symmetric, vallecular was clear, epiglottis was not deformed, subglottis/ pyriform and posterior pharyngeal walls were clear. No erythema, edema, pooling of secretions, masses or lesions. Airway patent, no foreign body visualized. Postcricoid area with moderate erythema and mild edema. + intra-artenoid bar. No pooling of secretions. True vocal cords, vestibular folds, ventricles, pyriform sinuses, and aryepiglottic folds appear normal bilaterally. Vocal cords mobile with good contact b/l.\par .\par

## 2020-05-12 ENCOUNTER — APPOINTMENT (OUTPATIENT)
Dept: DISASTER EMERGENCY | Facility: CLINIC | Age: 25
End: 2020-05-12

## 2020-05-12 LAB
SARS-COV-2 IGG SERPL IA-ACNC: 0 INDEX
SARS-COV-2 IGG SERPL QL IA: NEGATIVE

## 2020-05-20 ENCOUNTER — TRANSCRIPTION ENCOUNTER (OUTPATIENT)
Age: 25
End: 2020-05-20

## 2020-05-26 ENCOUNTER — APPOINTMENT (OUTPATIENT)
Dept: OTOLARYNGOLOGY | Facility: CLINIC | Age: 25
End: 2020-05-26
Payer: COMMERCIAL

## 2020-05-26 VITALS
SYSTOLIC BLOOD PRESSURE: 112 MMHG | HEART RATE: 72 BPM | HEIGHT: 69 IN | DIASTOLIC BLOOD PRESSURE: 70 MMHG | BODY MASS INDEX: 24.44 KG/M2 | TEMPERATURE: 98.2 F | WEIGHT: 165 LBS

## 2020-05-26 PROCEDURE — 31237 NSL/SINS NDSC SURG BX POLYPC: CPT | Mod: 50,58

## 2020-05-26 PROCEDURE — 99214 OFFICE O/P EST MOD 30 MIN: CPT | Mod: 25

## 2020-05-26 PROCEDURE — 31575 DIAGNOSTIC LARYNGOSCOPY: CPT | Mod: 58

## 2020-05-26 NOTE — ASSESSMENT
[FreeTextEntry1] : Pt with chronic sinusitis and nasal congestion with is refractory to extensive medical management. On exam, sigmoidal septal deviation, inferior turbinate hypertrophy and significant mucosal edema. with thin skull base \par patient follows up status post ESS, septoplasty with turbs 3/18/2020. Patient was debrided bilaterally with rigid suction as a result of nasal crusting. breathing much improved after detriment. \par -will follow up to assure no scarring and keep sinuses open\par \par \par pt with throat clearing with mild laryngeal reflux on exam. This is the most probable cause at this point. will treat for laryngeal reflux and consider other treatments if refractory\par -will proceed to start lifestyle regiment to reduce overproduction of acid and reduce laryngeal reflux including avoiding caffein, alcohol, eating before bed, spicy and fatty foods, and head elevation at night etc. Handout detailing regiment also given\par - pt would like to change his diet before he starts taking PPI \par \par \par pt with hearing loss, feels a bit better, less pressure s/p procedure\par -on exam: very tight neck. May lead to tinnitus and ear pressure \par - Myofascial Release therapy for the neck, will see if helps\par

## 2020-05-26 NOTE — CONSULT LETTER
[Please see my note below.] : Please see my note below. [FreeTextEntry1] : Dear Dr. CASSANDRA RAO \par I had the pleasure of evaluating your patient KAYLEY DONG, thank you for allowing us to participate in their care. please see full note detailing our visit below.\par If you have any questions, please do not hesitate to call me and I would be happy to discuss further. \par \par Dixon Joseph M.D.\par Attending Physician,  \par Department of Otolaryngology - Head and Neck Surgery\par [facility] \par Office: [phone]\par Fax: [phone]\par \par

## 2020-05-26 NOTE — PROCEDURE
[de-identified] : Procedure performed: laryngeal Endoscopy- Diagnostic\par Pre-op/post op indication: throat irritation \par Verbal and/or written consent obtained from patient, Patient was unable to cooperate with mirror\par Scope #: 3, flexible fiber optic telescope used \par Scope was introduced through the nose passed on the floor of the nose to the nasopharynx and then followed down the soft palate to the lower pharynx. The tongue Base, Larynx, Hypopharynx were examined. Base of tongue was symmetric, vallecular was clear, epiglottis was not deformed, subglottis/ pyriform and posterior pharyngeal walls were clear. +pooling of secretions and edema. No erythema, masses or lesions. Airway patent, no foreign body visualized. No glottic/supraglottic edema. True vocal cords, arytenoids, vestibular folds, ventricles, pyriform sinuses, and aryepiglottic folds appear normal bilaterally. Vocal cords mobile with good contact b/l.\par \par  [FreeTextEntry3] : procedure - bilateral endoscopic nasal  debridement\par Bilateral nasal cavities inspected with #0 ridged sinus endoscope. septum appeared midline and turbinates well reduced. bilateral middle meatuses were explored and suction and agitator were used to open ostiums and open any forming scar bands. all sinuses were explored and open at end of procedure\par

## 2020-05-26 NOTE — HISTORY OF PRESENT ILLNESS
[de-identified] : S/p Septo / turbs / ESS on 3/18/20\par pt doing saline washes 1x a week \par Pt states there is minimal nasal d/c and nasal congestion. much better than before. \par pt does have minimal sinus pressure \par pt uses astelin every other day. \par Pt denies blood\par \par got some intermittent pressure in the ear started last year. \par Tinnitus in the R ear that started last year. \par \par \par Throat pain has resolved\par no problem with speaking or swallowing, breathing, no globus sensation

## 2020-05-26 NOTE — END OF VISIT
[FreeTextEntry3] : I personally saw and examined KAYLEY DONG in detail. I spoke to FRIDA Shankar regarding the assessment and plan of care.  I preformed the procedures and I reviewed the above assessment and plan of care, and agree. I have made changes in changes in the body of the note where appropriate.\par \par

## 2020-06-02 ENCOUNTER — APPOINTMENT (OUTPATIENT)
Dept: OTOLARYNGOLOGY | Facility: CLINIC | Age: 25
End: 2020-06-02

## 2020-08-24 ENCOUNTER — APPOINTMENT (OUTPATIENT)
Dept: OTOLARYNGOLOGY | Facility: CLINIC | Age: 25
End: 2020-08-24
Payer: COMMERCIAL

## 2020-08-24 VITALS
DIASTOLIC BLOOD PRESSURE: 73 MMHG | HEART RATE: 67 BPM | WEIGHT: 165 LBS | SYSTOLIC BLOOD PRESSURE: 120 MMHG | TEMPERATURE: 97.7 F | BODY MASS INDEX: 24.44 KG/M2 | HEIGHT: 69 IN

## 2020-08-24 DIAGNOSIS — H93.8X3 OTHER SPECIFIED DISORDERS OF EAR, BILATERAL: ICD-10-CM

## 2020-08-24 PROCEDURE — 92567 TYMPANOMETRY: CPT

## 2020-08-24 PROCEDURE — 92557 COMPREHENSIVE HEARING TEST: CPT

## 2020-08-24 PROCEDURE — 31231 NASAL ENDOSCOPY DX: CPT

## 2020-08-24 PROCEDURE — 99214 OFFICE O/P EST MOD 30 MIN: CPT | Mod: 25

## 2020-08-24 NOTE — REVIEW OF SYSTEMS
[Ear Pain] : ear pain [Hearing Loss] : hearing loss [Dizziness] : dizziness [Ear Noises] : ear noises [Nasal Congestion] : nasal congestion [Negative] : Endocrine

## 2020-08-24 NOTE — ASSESSMENT
[FreeTextEntry1] : Patient is status post sinus surgery continues to have what he refers to his pressure in his ears consistent with eustachian tube dysfunction always constantly trying to pop his ears he does have a history of a symmetrical hearing loss as well.  On examination his ears are perfectly normal endoscopically his nasal cavity is healed audiogram confirmed his bilateral symmetrical sensorineural hearing loss he also has some tinnitus referred him to Dr. POSEY for further evaluation to determine if he VNG or any additional vestibular work-up would be indicated.

## 2020-08-24 NOTE — HISTORY OF PRESENT ILLNESS
[de-identified] : PAtient has been having issues with ear clogging bilaterally for the last week or so and admits that he had an episode of dizziness or lightheadedness last friday when he wore new ear buds. He does have ringing in the right ear that keeps him awake at night. He does not have any issues with his hearing from what he can tell, except known high frequency hearing loss. He has a history of sinus surgery in March 2020 with Dr Mcconnell and has been doing well in regards to sinuses since. He does not have any current sinus pressure or pain. He is using FLonase daily with no benefit to his ears

## 2020-08-31 ENCOUNTER — APPOINTMENT (OUTPATIENT)
Dept: PSYCHIATRY | Facility: CLINIC | Age: 25
End: 2020-08-31
Payer: COMMERCIAL

## 2020-08-31 PROCEDURE — 99205 OFFICE O/P NEW HI 60 MIN: CPT

## 2020-09-14 ENCOUNTER — APPOINTMENT (OUTPATIENT)
Dept: OTOLARYNGOLOGY | Facility: CLINIC | Age: 25
End: 2020-09-14
Payer: COMMERCIAL

## 2020-09-14 ENCOUNTER — APPOINTMENT (OUTPATIENT)
Dept: PSYCHIATRY | Facility: CLINIC | Age: 25
End: 2020-09-14

## 2020-09-14 VITALS
SYSTOLIC BLOOD PRESSURE: 122 MMHG | BODY MASS INDEX: 24.44 KG/M2 | DIASTOLIC BLOOD PRESSURE: 83 MMHG | HEIGHT: 69 IN | WEIGHT: 165 LBS | TEMPERATURE: 98.2 F | HEART RATE: 80 BPM

## 2020-09-14 DIAGNOSIS — H83.8X3 OTHER SPECIFIED DISEASES OF INNER EAR, BILATERAL: ICD-10-CM

## 2020-09-14 DIAGNOSIS — R09.89 OTHER SPECIFIED SYMPTOMS AND SIGNS INVOLVING THE CIRCULATORY AND RESPIRATORY SYSTEMS: ICD-10-CM

## 2020-09-14 PROCEDURE — 99214 OFFICE O/P EST MOD 30 MIN: CPT

## 2020-09-14 NOTE — DATA REVIEWED
[de-identified] : stable SNHL\par Abn-tymp on the left\par Hearing Test performed to evaluate the extent of hearing loss and  to explain pt's symptoms\par

## 2020-09-14 NOTE — ASSESSMENT
[FreeTextEntry1] : Left ETD r/o TMJ\par -balloon dilation discussed \par -referred to  \par -possible M&T if not a candidate for balloon \par Stress reduction\par poss DDS eval for TMJ\par \par f/u prn

## 2020-09-14 NOTE — PHYSICAL EXAM
[Midline] : trachea located in midline position [Normal] : orientation to person, place, and time: normal [de-identified] : georges

## 2020-09-14 NOTE — HISTORY OF PRESENT ILLNESS
[No] : patient does not have a  history of radiation therapy [de-identified] : 25 yo male\par PAtient complains of bilateral ear pressure x 6 months. States left is worse than right, can pop the left ear at times. He has a history of sinus surgery in March 2020 with Dr Mcconnell and has been doing well in regards to sinuses since. He does not have any current sinus pressure or pain. He is using FLonase daily with no benefit to his ears. Also has a constant right sided tinnitus. \par Popping ear helps the clogged feeling\par anxious\par Pt has no ear pain, ear drainage, hearing loss, vertigo, nasal congestion, nasal discharge, epistaxis, sinus infections, facial pain, facial pressure, throat pain, dysphagia or fevers\par \par  [Dizziness] : no dizziness [Headache] : no headache [Anxiety] : no anxiety [Hearing Loss] : no hearing loss [Recurrent Otitis Media] : no recurrent otitis media [Otitis Media with Effusion] : no otitis media with effusion [Presbycusis] : no presbycusis [Otosclerosis] : no otosclerosis [Meniere Disease] : no Meniere disease [Congenital Ear Malformation] : no congenital ear malformation [Loud Noise Exposure] : no history of loud noise exposure [Hypertension] : no hypertension [Perilymphatic Fistula] : no perilymphatic fistula [Smoking] : no smoking [Early Onset Hearing Loss] : no early onset hearing loss [Stroke] : no stroke [Facial Pressure] : no facial pressure [Facial Pain] : no facial pain [Nasal Congestion] : no nasal congestion [Ear Fullness] : no ear fullness [Diplopia] : no diplopia [Maxillary Tooth Infection] : no maxillary tooth infection [Allergic Rhinitis] : no allergic rhinitis [Septal Deviation] : no septal deviation [Environmental Allergies] : no environmental allergies [Seasonal Allergies] : no seasonal allergies [Adenoidectomy] : no adenoidectomy [Environmental Allergens] : no environmental allergens [Asthma] : no asthma [Allergies] : no allergies [Nasal FB Removal] : no nasal foreign body removal [Neck Mass] : no neck mass [Neck Pain] : no neck pain [Chills] : no chills [Cough] : no cough [Cold Intolerance] : no cold intolerance [Hyperthyroidism] : no hyperthyroidism [Fatigue] : no fatigue [Heat Intolerance] : no heat intolerance [Sialadenitis] : no sialadenitis [Non-Hodgkin Lymphoma] : no non-hodgkin lymphoma [Hodgkin Disease] : no hodgkin disease [Tobacco Use] : no tobacco use [Alcohol Use] : no alcohol use [Thyroid Cancer] : no thyroid cancer [Graves Disease] : no graves disease

## 2020-09-28 ENCOUNTER — APPOINTMENT (OUTPATIENT)
Dept: PSYCHIATRY | Facility: CLINIC | Age: 25
End: 2020-09-28

## 2020-10-08 ENCOUNTER — TRANSCRIPTION ENCOUNTER (OUTPATIENT)
Age: 25
End: 2020-10-08

## 2020-12-30 ENCOUNTER — APPOINTMENT (OUTPATIENT)
Dept: PSYCHIATRY | Facility: CLINIC | Age: 25
End: 2020-12-30
Payer: COMMERCIAL

## 2020-12-30 PROCEDURE — 99072 ADDL SUPL MATRL&STAF TM PHE: CPT

## 2020-12-30 PROCEDURE — 99214 OFFICE O/P EST MOD 30 MIN: CPT

## 2021-02-12 ENCOUNTER — NON-APPOINTMENT (OUTPATIENT)
Age: 26
End: 2021-02-12

## 2021-02-12 ENCOUNTER — APPOINTMENT (OUTPATIENT)
Dept: NEUROSURGERY | Facility: CLINIC | Age: 26
End: 2021-02-12
Payer: COMMERCIAL

## 2021-02-12 VITALS — HEIGHT: 68 IN | WEIGHT: 165 LBS | BODY MASS INDEX: 25.01 KG/M2

## 2021-02-12 DIAGNOSIS — H93.A9 PULSATILE TINNITUS, UNSPECIFIED EAR: ICD-10-CM

## 2021-02-12 DIAGNOSIS — H93.11 TINNITUS, RIGHT EAR: ICD-10-CM

## 2021-02-12 PROCEDURE — 99203 OFFICE O/P NEW LOW 30 MIN: CPT | Mod: 95

## 2021-02-12 RX ORDER — TRIAMCINOLONE ACETONIDE 1 MG/G
0.1 CREAM TOPICAL
Qty: 1 | Refills: 1 | Status: DISCONTINUED | COMMUNITY
Start: 2018-12-27 | End: 2021-02-12

## 2021-02-12 RX ORDER — AMOXICILLIN AND CLAVULANATE POTASSIUM 875; 125 MG/1; MG/1
875-125 TABLET, COATED ORAL
Qty: 20 | Refills: 0 | Status: DISCONTINUED | COMMUNITY
Start: 2020-10-09 | End: 2021-02-12

## 2021-02-12 RX ORDER — PREDNISONE 10 MG/1
10 TABLET ORAL
Qty: 115 | Refills: 0 | Status: DISCONTINUED | COMMUNITY
Start: 2019-07-24 | End: 2021-02-12

## 2021-02-12 RX ORDER — KETOCONAZOLE 20 MG/G
2 CREAM TOPICAL
Qty: 1 | Refills: 5 | Status: DISCONTINUED | COMMUNITY
Start: 2018-08-20 | End: 2021-02-12

## 2021-02-12 RX ORDER — METRONIDAZOLE 7.5 MG/G
0.75 CREAM TOPICAL
Qty: 1 | Refills: 2 | Status: DISCONTINUED | COMMUNITY
Start: 2018-08-20 | End: 2021-02-12

## 2021-02-12 RX ORDER — PREDNISONE 10 MG/1
10 TABLET ORAL
Qty: 27 | Refills: 0 | Status: DISCONTINUED | COMMUNITY
Start: 2019-12-05 | End: 2021-02-12

## 2021-02-12 RX ORDER — PREDNISONE 10 MG/1
10 TABLET ORAL
Qty: 27 | Refills: 0 | Status: DISCONTINUED | COMMUNITY
Start: 2020-03-15 | End: 2021-02-12

## 2021-02-12 RX ORDER — GABAPENTIN 300 MG/1
300 CAPSULE ORAL
Qty: 30 | Refills: 0 | Status: DISCONTINUED | COMMUNITY
Start: 2020-08-31 | End: 2021-02-12

## 2021-02-12 RX ORDER — OXYMETHAZOLINE HCL 0.05 MG/1
0.05 SPRAY NASAL
Qty: 1 | Refills: 0 | Status: DISCONTINUED | COMMUNITY
Start: 2019-12-05 | End: 2021-02-12

## 2021-02-12 RX ORDER — PERMETHRIN 50 MG/G
5 CREAM TOPICAL
Qty: 2 | Refills: 1 | Status: DISCONTINUED | COMMUNITY
Start: 2018-12-27 | End: 2021-02-12

## 2021-02-12 RX ORDER — AMOXICILLIN AND CLAVULANATE POTASSIUM 875; 125 MG/1; MG/1
875-125 TABLET, COATED ORAL TWICE DAILY
Qty: 28 | Refills: 0 | Status: DISCONTINUED | COMMUNITY
Start: 2019-12-05 | End: 2021-02-12

## 2021-02-12 RX ORDER — METHYLPREDNISOLONE 4 MG/1
4 TABLET ORAL
Qty: 1 | Refills: 0 | Status: DISCONTINUED | COMMUNITY
Start: 2020-10-10 | End: 2021-02-12

## 2021-02-12 RX ORDER — CLONAZEPAM 0.5 MG/1
0.5 TABLET ORAL
Qty: 30 | Refills: 0 | Status: DISCONTINUED | COMMUNITY
Start: 2019-07-24 | End: 2021-02-12

## 2021-02-12 RX ORDER — OMEPRAZOLE 40 MG/1
40 CAPSULE, DELAYED RELEASE ORAL
Qty: 90 | Refills: 2 | Status: DISCONTINUED | COMMUNITY
Start: 2020-04-27 | End: 2021-02-12

## 2021-02-12 RX ORDER — AMOXICILLIN AND CLAVULANATE POTASSIUM 875; 125 MG/1; MG/1
875-125 TABLET, COATED ORAL TWICE DAILY
Qty: 20 | Refills: 0 | Status: DISCONTINUED | COMMUNITY
Start: 2020-03-15 | End: 2021-02-12

## 2021-02-12 RX ORDER — AZITHROMYCIN 250 MG/1
250 TABLET, FILM COATED ORAL
Qty: 6 | Refills: 1 | Status: DISCONTINUED | COMMUNITY
Start: 2020-10-09 | End: 2021-02-12

## 2021-02-12 RX ORDER — CITALOPRAM HYDROBROMIDE 20 MG/1
20 TABLET, FILM COATED ORAL
Qty: 30 | Refills: 0 | Status: DISCONTINUED | COMMUNITY
Start: 2020-08-31 | End: 2021-02-12

## 2021-02-12 NOTE — ASSESSMENT
[FreeTextEntry1] : Impression:\par Predominantly Non-pulsatile tinnitus\par Unsure if there is any pulsatile component\par We discussed the difference between pulsatile and non-pulsatile tinnitus\par Will arrange for MRA to rule out cerebrovascular lesion\par \par Plan:\par MRA head with and without contrast\par Follow-up after the imaging

## 2021-02-12 NOTE — HISTORY OF PRESENT ILLNESS
[de-identified] : Mr. DONG is a pleasant 25 year old male who presents today with a chief complaint of right ear NON-pulsatile tinnitus.  It first started in 7/2019 during a hearing test.  He has high frequency hearing loss since birth.  Since that time it has been getting progressively worse.  It is described as a constant, whistle or ringing sound.  There is no position or anything he can do to improve the sound.  He has been seen by multiple ENTs and has been advised to wear hearing aids but he does not want to.  He feels like he is getting depressed, withdrawn and his work is negatively effected.  He gets headaches 2-3x/week which improved after sinus surgery.  He denies any blurry vision or diplopia.\par \par How much is pulsatile tinnitus affecting your quality of life (0-10, 10 worst)? 9\par

## 2021-02-12 NOTE — REVIEW OF SYSTEMS
[Anxiety] : anxiety [Depression] : depression [As Noted in HPI] : as noted in HPI [Loss Of Hearing] : hearing loss [Negative] : Heme/Lymph [Suicidal] : not suicidal [FreeTextEntry4] : Right Ear Tinnitus

## 2021-02-12 NOTE — DATA REVIEWED
[de-identified] : \par \par EXAM: MR BRAIN WAW IC \par \par \par PROCEDURE DATE: 07/29/2019 \par \par \par \par INTERPRETATION: INDICATION: Hearing loss, tinnitus right ear. \par \par TECHNIQUE: Brain and IAC MR with and without contrast. \par \par Sagittal T1, axial T2, FLAIR, gradient-echo, and diffusion-weighted imaging \par of the brain is obtained followed by thin section pre- and post-contrast \par axial and coronal T1 imaging through the internal auditory canal and \par additional axial FIESTA sequence through the brain stem. Post-contrast \par axial, coronal, and sagittal T1 images are also obtained. \par \par 7.5 mls of Gadavist was administered intravenously without complication and \par 0 mls were discarded. \par \par COMPARISON: MR brain dated 4/1/2019. \par \par FINDINGS: \par \par The bilateral cranial nerves VII and VIII and internal auditory meati \par demonstrate unremarkable signal intensity and morphology. There are no \par obvious enhancing mass lesions within the bilateral cerebellopontine angles. \par There is no evidence for any abnormal enhancement within the membranous \par labyrinth. The nerves V and VI are normal. \par \par The bilateral sulci and ventricular volumes are appropriate for age. \par \par There is no acute infarct on diffusion weighted sequences. There is no \par evidence for acute intracranial hemorrhage. There is mass effect or midline \par shift. \par \par There is no abnormal intracranial enhancement. \par \par There is no extra-axial fluid collection. \par \par Flow voids are seen within the intracranial arterial vessels and dural \par sinuses. \par \par The pituitary and posterior fossa demonstrate unremarkable signal intensity \par and morphology. \par \par The bilateral orbits are unremarkable. \par \par The visualized paranasal sinuses and bilateral tympanomastoid cavities are \par clear. The previously opacified right frontal and ethmoid air cells have \par cleared. \par \par IMPRESSION: \par \par No MR evidence of mass or abnormal enhancement in the internal auditory \par canal or abnormal intracranial enhancement. \par \par \par \par \par \par \par \par \par STALIN SCHRADER M.D., RADIOLOGY FELLOW \par This document has been electronically signed. \par RAFAEL INMAN M.D., ATTENDING RADIOLOGIST \par This document has been electronically signed. Jul 30 2019 10:45AM

## 2021-02-12 NOTE — PHYSICAL EXAM
[General Appearance - Alert] : alert [General Appearance - In No Acute Distress] : in no acute distress [] : normal voice and communication [Oriented To Time, Place, And Person] : oriented to person, place, and time [Person] : oriented to person [Place] : oriented to place [Time] : oriented to time

## 2021-02-12 NOTE — REASON FOR VISIT
[Home] : at home, [unfilled] , at the time of the visit. [Other Location: e.g. Home (Enter Location, City,State)___] : at [unfilled] [Verbal consent obtained from patient] : the patient, [unfilled] [New Patient Visit] : a new patient visit [FreeTextEntry1] : Pulsatile Tinnitus

## 2021-04-30 ENCOUNTER — NON-APPOINTMENT (OUTPATIENT)
Age: 26
End: 2021-04-30

## 2021-05-16 ENCOUNTER — TRANSCRIPTION ENCOUNTER (OUTPATIENT)
Age: 26
End: 2021-05-16

## 2021-05-24 ENCOUNTER — APPOINTMENT (OUTPATIENT)
Dept: OTOLARYNGOLOGY | Facility: CLINIC | Age: 26
End: 2021-05-24
Payer: COMMERCIAL

## 2021-05-24 VITALS
HEART RATE: 69 BPM | TEMPERATURE: 97.7 F | HEIGHT: 68 IN | DIASTOLIC BLOOD PRESSURE: 79 MMHG | BODY MASS INDEX: 25.01 KG/M2 | WEIGHT: 165 LBS | SYSTOLIC BLOOD PRESSURE: 125 MMHG

## 2021-05-24 PROCEDURE — 99214 OFFICE O/P EST MOD 30 MIN: CPT | Mod: 25

## 2021-05-24 PROCEDURE — 92557 COMPREHENSIVE HEARING TEST: CPT

## 2021-05-24 PROCEDURE — 31231 NASAL ENDOSCOPY DX: CPT

## 2021-05-24 PROCEDURE — 92567 TYMPANOMETRY: CPT

## 2021-05-24 PROCEDURE — 99072 ADDL SUPL MATRL&STAF TM PHE: CPT

## 2021-05-24 NOTE — CONSULT LETTER
[Please see my note below.] : Please see my note below. [FreeTextEntry1] : Dear Dr. CASSANDRA RAO \par I had the pleasure of evaluating your patient KAYLEY DONG, thank you for allowing us to participate in their care. please see full note detailing our visit below.\par If you have any questions, please do not hesitate to call me and I would be happy to discuss further. \par \par Dixon Joseph M.D.\par Attending Physician,  \par Department of Otolaryngology - Head and Neck Surgery\par [facility] \par Office: [phone]\par Fax: [phone]\par \par

## 2021-05-24 NOTE — ASSESSMENT
[FreeTextEntry1] : Pt with chronic sinusitis and nasal congestion with is refractory to extensive medical management. On exam, sigmoidal septal deviation, inferior turbinate hypertrophy and significant mucosal edema. with thin skull base \par patient follows up status post ESS, septoplasty with turbs 3/18/2020. \par very happy with results of surgery - pressure and breathing \par all sinuses open \par \par pt with hearing loss, with pressure \par -does have some tightness in the neck. May lead to tinnitus and ear pressure \par - Myofascial Release\par - discussed tubes - risk bleeding, infection, TM perf, hearing loss, scarring, tinnitus. he is very sensitive to things in his body and is higher risk of something bothering him after. he will consider \par

## 2021-05-24 NOTE — PROCEDURE
[FreeTextEntry3] : Procedure performed: Nasal Endoscopy- Diagnostic\par Pre-op indication(s): nasal congestion\par Post-op indication(s): nasal congestion \par Verbal and/or written consent obtained from patient\par Anterior rhinoscopy insufficient to account for symptoms\par Scope #: 3,  flexible fiber optic telescope \par The scope was introduced in the nasal passage between the middle and inferior turbinates to exam the inferior portion of the middle meatus and the fontanelle, as well as the maxillary ostia.  Next, the scope was passed medically and posteriorly to the middle turbinates to examine the sphenoethmoid recess and the superior turbinate region.\par Upon visualization the finders are as follows:\par Nasal Septum: midline septum\par Bilateral - Mucosa: reduced turbinates, Mucous: scant, Polyp: not seen, Inferior Turbinate: boggy, Middle Turbinate: normal, Superior Turbinate: normal, Inferior Meatus: narrow, Middle Meatus: wide open - able to see into all sinuses Super Meatus:normal, Sphenoethmoidal Recess: clear\par

## 2021-05-24 NOTE — HISTORY OF PRESENT ILLNESS
[de-identified] : S/p Septo / turbs / ESS on 3/18/20\par breathing is good\par pt does have minimal sinus pressure \par pt uses astelin PRN\par \par Tinnitus in the R ear that started last year. \par intermittent pressure b.l, left is worse \par feels hearing is a bit down \par no vertigo \par has been to a multitude of ENT about it, feels has not gotten a good answer \par \par Throat pain has resolved\par no problem with speaking or swallowing, breathing, no globus sensation

## 2021-06-19 ENCOUNTER — TRANSCRIPTION ENCOUNTER (OUTPATIENT)
Age: 26
End: 2021-06-19

## 2021-07-08 ENCOUNTER — TRANSCRIPTION ENCOUNTER (OUTPATIENT)
Age: 26
End: 2021-07-08

## 2021-07-11 ENCOUNTER — NON-APPOINTMENT (OUTPATIENT)
Age: 26
End: 2021-07-11

## 2021-07-12 ENCOUNTER — APPOINTMENT (OUTPATIENT)
Dept: OTOLARYNGOLOGY | Facility: CLINIC | Age: 26
End: 2021-07-12
Payer: COMMERCIAL

## 2021-07-12 VITALS
TEMPERATURE: 98 F | SYSTOLIC BLOOD PRESSURE: 136 MMHG | BODY MASS INDEX: 25.18 KG/M2 | WEIGHT: 170 LBS | HEIGHT: 69 IN | DIASTOLIC BLOOD PRESSURE: 76 MMHG | HEART RATE: 87 BPM

## 2021-07-12 PROCEDURE — 31231 NASAL ENDOSCOPY DX: CPT

## 2021-07-12 PROCEDURE — 99214 OFFICE O/P EST MOD 30 MIN: CPT | Mod: 25

## 2021-07-12 PROCEDURE — 99072 ADDL SUPL MATRL&STAF TM PHE: CPT

## 2021-07-12 NOTE — HISTORY OF PRESENT ILLNESS
[de-identified] : PAtient has had pressure in the cheeks and hjas been moderately nasally congested for the last few weeks. \par He blew his nose and he felt air and pressure in the ears right after. HE was taking an antihistamine and was using Afrin with only minor benefit. He is blowing out thick green mucus and still has pressure in the cheeks under the eyes bilaterally. He is coughing and is able to bring up a lot of mucus. Since he had his nasal surgery he has been getting sinus infections much less frequently. He still has pulsatile tinnitus but no changes. IN the past he has taken oral antibitoics and steroids and the pressure and mucus resolves

## 2021-07-12 NOTE — ASSESSMENT
[FreeTextEntry1] : Patient once again with a sinus infection had sinus infection he had sinus surgery back in 2020 by Dr. Joseph since then continues to have some recurrent bouts of sinusitis mostly respond to antibiotics and steroids.  Frequency however is significantly diminished according to the patient.  On examination endoscopically still a lot of secretions a lot of thick mucus both of his sinuses are patent and easily accessible.  Was endoscopically debrided put him on a Medrol Dosepak as well as Augmentin once again it seems to be helping him.  Consideration for possible further work-up from an immunologic point of view may be can be maybe consideration since his frequencies continue.

## 2021-08-17 ENCOUNTER — EMERGENCY (EMERGENCY)
Facility: HOSPITAL | Age: 26
LOS: 1 days | Discharge: HOME | End: 2021-08-17
Attending: EMERGENCY MEDICINE
Payer: COMMERCIAL

## 2021-08-17 ENCOUNTER — TRANSCRIPTION ENCOUNTER (OUTPATIENT)
Age: 26
End: 2021-08-17

## 2021-08-17 VITALS
HEIGHT: 69 IN | DIASTOLIC BLOOD PRESSURE: 74 MMHG | TEMPERATURE: 98 F | OXYGEN SATURATION: 97 % | SYSTOLIC BLOOD PRESSURE: 108 MMHG | RESPIRATION RATE: 18 BRPM | WEIGHT: 169.98 LBS | HEART RATE: 110 BPM

## 2021-08-17 VITALS — HEART RATE: 88 BPM | OXYGEN SATURATION: 97 % | RESPIRATION RATE: 16 BRPM | TEMPERATURE: 99 F

## 2021-08-17 DIAGNOSIS — Z87.19 PERSONAL HISTORY OF OTHER DISEASES OF THE DIGESTIVE SYSTEM: Chronic | ICD-10-CM

## 2021-08-17 DIAGNOSIS — H04.559 ACQUIRED STENOSIS OF UNSPECIFIED NASOLACRIMAL DUCT: Chronic | ICD-10-CM

## 2021-08-17 LAB
ALBUMIN SERPL ELPH-MCNC: 4.7 G/DL — SIGNIFICANT CHANGE UP (ref 3.3–5)
ALP SERPL-CCNC: 83 U/L — SIGNIFICANT CHANGE UP (ref 40–120)
ALT FLD-CCNC: 16 U/L — SIGNIFICANT CHANGE UP (ref 10–45)
ANION GAP SERPL CALC-SCNC: 16 MMOL/L — SIGNIFICANT CHANGE UP (ref 5–17)
APPEARANCE UR: ABNORMAL
AST SERPL-CCNC: 19 U/L — SIGNIFICANT CHANGE UP (ref 10–40)
BACTERIA # UR AUTO: NEGATIVE — SIGNIFICANT CHANGE UP
BASOPHILS # BLD AUTO: 0.01 K/UL — SIGNIFICANT CHANGE UP (ref 0–0.2)
BASOPHILS NFR BLD AUTO: 0.1 % — SIGNIFICANT CHANGE UP (ref 0–2)
BILIRUB SERPL-MCNC: 0.7 MG/DL — SIGNIFICANT CHANGE UP (ref 0.2–1.2)
BILIRUB UR-MCNC: ABNORMAL
BUN SERPL-MCNC: 16 MG/DL — SIGNIFICANT CHANGE UP (ref 7–23)
CA-I SERPL-SCNC: 1.17 MMOL/L — SIGNIFICANT CHANGE UP (ref 1.15–1.33)
CALCIUM SERPL-MCNC: 10 MG/DL — SIGNIFICANT CHANGE UP (ref 8.4–10.5)
CHLORIDE BLDV-SCNC: 98 MMOL/L — SIGNIFICANT CHANGE UP (ref 96–108)
CHLORIDE SERPL-SCNC: 99 MMOL/L — SIGNIFICANT CHANGE UP (ref 96–108)
CO2 BLDV-SCNC: 34 MMOL/L — HIGH (ref 22–26)
CO2 SERPL-SCNC: 26 MMOL/L — SIGNIFICANT CHANGE UP (ref 22–31)
COLOR SPEC: YELLOW — SIGNIFICANT CHANGE UP
COMMENT - URINE: SIGNIFICANT CHANGE UP
CREAT SERPL-MCNC: 1 MG/DL — SIGNIFICANT CHANGE UP (ref 0.5–1.3)
DIFF PNL FLD: NEGATIVE — SIGNIFICANT CHANGE UP
EOSINOPHIL # BLD AUTO: 0.02 K/UL — SIGNIFICANT CHANGE UP (ref 0–0.5)
EOSINOPHIL NFR BLD AUTO: 0.2 % — SIGNIFICANT CHANGE UP (ref 0–6)
EPI CELLS # UR: 2 /HPF — SIGNIFICANT CHANGE UP
GAS PNL BLDV: 136 MMOL/L — SIGNIFICANT CHANGE UP (ref 136–145)
GAS PNL BLDV: SIGNIFICANT CHANGE UP
GLUCOSE BLDV-MCNC: 118 MG/DL — HIGH (ref 70–99)
GLUCOSE SERPL-MCNC: 112 MG/DL — HIGH (ref 70–99)
GLUCOSE UR QL: NEGATIVE — SIGNIFICANT CHANGE UP
HCO3 BLDV-SCNC: 33 MMOL/L — HIGH (ref 22–29)
HCT VFR BLD CALC: 52.2 % — HIGH (ref 39–50)
HCT VFR BLDA CALC: 54 % — HIGH (ref 39–51)
HGB BLD CALC-MCNC: 18 G/DL — HIGH (ref 12.6–17.4)
HGB BLD-MCNC: 17.6 G/DL — HIGH (ref 13–17)
HYALINE CASTS # UR AUTO: 26 /LPF — HIGH (ref 0–2)
IMM GRANULOCYTES NFR BLD AUTO: 0.4 % — SIGNIFICANT CHANGE UP (ref 0–1.5)
KETONES UR-MCNC: ABNORMAL
LACTATE BLDV-MCNC: 1.5 MMOL/L — SIGNIFICANT CHANGE UP (ref 0.7–2)
LEUKOCYTE ESTERASE UR-ACNC: NEGATIVE — SIGNIFICANT CHANGE UP
LIDOCAIN IGE QN: 16 U/L — SIGNIFICANT CHANGE UP (ref 7–60)
LYMPHOCYTES # BLD AUTO: 0.88 K/UL — LOW (ref 1–3.3)
LYMPHOCYTES # BLD AUTO: 8.4 % — LOW (ref 13–44)
MCHC RBC-ENTMCNC: 30 PG — SIGNIFICANT CHANGE UP (ref 27–34)
MCHC RBC-ENTMCNC: 33.7 GM/DL — SIGNIFICANT CHANGE UP (ref 32–36)
MCV RBC AUTO: 89.1 FL — SIGNIFICANT CHANGE UP (ref 80–100)
MONOCYTES # BLD AUTO: 1.08 K/UL — HIGH (ref 0–0.9)
MONOCYTES NFR BLD AUTO: 10.3 % — SIGNIFICANT CHANGE UP (ref 2–14)
NEUTROPHILS # BLD AUTO: 8.41 K/UL — HIGH (ref 1.8–7.4)
NEUTROPHILS NFR BLD AUTO: 80.6 % — HIGH (ref 43–77)
NITRITE UR-MCNC: NEGATIVE — SIGNIFICANT CHANGE UP
NRBC # BLD: 0 /100 WBCS — SIGNIFICANT CHANGE UP (ref 0–0)
PCO2 BLDV: 54 MMHG — SIGNIFICANT CHANGE UP (ref 42–55)
PH BLDV: 7.39 — SIGNIFICANT CHANGE UP (ref 7.32–7.43)
PH UR: 6 — SIGNIFICANT CHANGE UP (ref 5–8)
PLATELET # BLD AUTO: 290 K/UL — SIGNIFICANT CHANGE UP (ref 150–400)
PO2 BLDV: 17 MMHG — LOW (ref 25–45)
POTASSIUM BLDV-SCNC: 3.7 MMOL/L — SIGNIFICANT CHANGE UP (ref 3.5–5.1)
POTASSIUM SERPL-MCNC: 4 MMOL/L — SIGNIFICANT CHANGE UP (ref 3.5–5.3)
POTASSIUM SERPL-SCNC: 4 MMOL/L — SIGNIFICANT CHANGE UP (ref 3.5–5.3)
PROT SERPL-MCNC: 8 G/DL — SIGNIFICANT CHANGE UP (ref 6–8.3)
PROT UR-MCNC: ABNORMAL
RBC # BLD: 5.86 M/UL — HIGH (ref 4.2–5.8)
RBC # FLD: 12.7 % — SIGNIFICANT CHANGE UP (ref 10.3–14.5)
RBC CASTS # UR COMP ASSIST: 2 /HPF — SIGNIFICANT CHANGE UP (ref 0–4)
SAO2 % BLDV: 23.4 % — LOW (ref 67–88)
SODIUM SERPL-SCNC: 141 MMOL/L — SIGNIFICANT CHANGE UP (ref 135–145)
SP GR SPEC: 1.04 — HIGH (ref 1.01–1.02)
UROBILINOGEN FLD QL: ABNORMAL
WBC # BLD: 10.44 K/UL — SIGNIFICANT CHANGE UP (ref 3.8–10.5)
WBC # FLD AUTO: 10.44 K/UL — SIGNIFICANT CHANGE UP (ref 3.8–10.5)
WBC UR QL: 4 /HPF — SIGNIFICANT CHANGE UP (ref 0–5)

## 2021-08-17 PROCEDURE — 96375 TX/PRO/DX INJ NEW DRUG ADDON: CPT

## 2021-08-17 PROCEDURE — 84295 ASSAY OF SERUM SODIUM: CPT

## 2021-08-17 PROCEDURE — 82435 ASSAY OF BLOOD CHLORIDE: CPT

## 2021-08-17 PROCEDURE — 93005 ELECTROCARDIOGRAM TRACING: CPT

## 2021-08-17 PROCEDURE — 83690 ASSAY OF LIPASE: CPT

## 2021-08-17 PROCEDURE — 82947 ASSAY GLUCOSE BLOOD QUANT: CPT

## 2021-08-17 PROCEDURE — 99284 EMERGENCY DEPT VISIT MOD MDM: CPT

## 2021-08-17 PROCEDURE — 96374 THER/PROPH/DIAG INJ IV PUSH: CPT

## 2021-08-17 PROCEDURE — 99284 EMERGENCY DEPT VISIT MOD MDM: CPT | Mod: 25

## 2021-08-17 PROCEDURE — 85014 HEMATOCRIT: CPT

## 2021-08-17 PROCEDURE — 83735 ASSAY OF MAGNESIUM: CPT

## 2021-08-17 PROCEDURE — 80053 COMPREHEN METABOLIC PANEL: CPT

## 2021-08-17 PROCEDURE — 83605 ASSAY OF LACTIC ACID: CPT

## 2021-08-17 PROCEDURE — 81001 URINALYSIS AUTO W/SCOPE: CPT

## 2021-08-17 PROCEDURE — 82330 ASSAY OF CALCIUM: CPT

## 2021-08-17 PROCEDURE — 85018 HEMOGLOBIN: CPT

## 2021-08-17 PROCEDURE — 85025 COMPLETE CBC W/AUTO DIFF WBC: CPT

## 2021-08-17 PROCEDURE — 82803 BLOOD GASES ANY COMBINATION: CPT

## 2021-08-17 PROCEDURE — 87086 URINE CULTURE/COLONY COUNT: CPT

## 2021-08-17 PROCEDURE — 84132 ASSAY OF SERUM POTASSIUM: CPT

## 2021-08-17 RX ORDER — SODIUM CHLORIDE 9 MG/ML
1000 INJECTION INTRAMUSCULAR; INTRAVENOUS; SUBCUTANEOUS ONCE
Refills: 0 | Status: COMPLETED | OUTPATIENT
Start: 2021-08-17 | End: 2021-08-17

## 2021-08-17 RX ORDER — ONDANSETRON 8 MG/1
4 TABLET, FILM COATED ORAL ONCE
Refills: 0 | Status: COMPLETED | OUTPATIENT
Start: 2021-08-17 | End: 2021-08-17

## 2021-08-17 RX ORDER — FAMOTIDINE 10 MG/ML
20 INJECTION INTRAVENOUS ONCE
Refills: 0 | Status: COMPLETED | OUTPATIENT
Start: 2021-08-17 | End: 2021-08-17

## 2021-08-17 RX ORDER — KETOROLAC TROMETHAMINE 30 MG/ML
15 SYRINGE (ML) INJECTION ONCE
Refills: 0 | Status: DISCONTINUED | OUTPATIENT
Start: 2021-08-17 | End: 2021-08-17

## 2021-08-17 RX ADMIN — SODIUM CHLORIDE 1000 MILLILITER(S): 9 INJECTION INTRAMUSCULAR; INTRAVENOUS; SUBCUTANEOUS at 13:34

## 2021-08-17 RX ADMIN — FAMOTIDINE 20 MILLIGRAM(S): 10 INJECTION INTRAVENOUS at 12:34

## 2021-08-17 RX ADMIN — ONDANSETRON 4 MILLIGRAM(S): 8 TABLET, FILM COATED ORAL at 13:34

## 2021-08-17 RX ADMIN — SODIUM CHLORIDE 1000 MILLILITER(S): 9 INJECTION INTRAMUSCULAR; INTRAVENOUS; SUBCUTANEOUS at 14:14

## 2021-08-17 RX ADMIN — Medication 15 MILLIGRAM(S): at 12:34

## 2021-08-17 RX ADMIN — ONDANSETRON 4 MILLIGRAM(S): 8 TABLET, FILM COATED ORAL at 14:14

## 2021-08-17 NOTE — ED PROVIDER NOTE - PHYSICAL EXAMINATION
Gen - NAD; well-appearing; A+Ox3   HEENT - NCAT, EOMI, dry oral mucosa  Neck - supple  Resp - CTAB  CV -  RRR  Abd - soft, ND, mildly tender to palpation over periumbilical region, no lower abdominal tenderness; no guarding or rebound  MSK - 5/5 strength and FROM b/l UE and LE  Extrem - no LE edema/erythema/tenderness  Neuro - no focal motor or sensation deficits

## 2021-08-17 NOTE — ED PROVIDER NOTE - PROGRESS NOTE DETAILS
Tyler PGY-2: Patient reassessed, states feels better with IVF/zofran x2, will attempt PO challenge. Abdomen soft and less tender than previous exam. Will PO challenge, d/c if passes Patient is reassessed, states feeling much better at this time. PO challenge and continue to monitor. RGUJRAL Father states pt had pyloric stenosis as a child. Pt tolerated PO challenge, feels much better. Return precautions discussed with patient and father at bedside. RGUJRAL

## 2021-08-17 NOTE — ED PROVIDER NOTE - PATIENT PORTAL LINK FT
You can access the FollowMyHealth Patient Portal offered by [facility] by registering at the following website: [email or web address]. By joining [facility]’s FollowMyHealth portal, you will also be able to view your health information using other applications (apps) compatible with our system.

## 2021-08-17 NOTE — ED ADULT NURSE REASSESSMENT NOTE - NS ED NURSE REASSESS COMMENT FT1
pt stable, walked to bathroom for urine sample, pending urine results
pt just had a bout of vomiting, pending Zofran order

## 2021-08-17 NOTE — ED PROVIDER NOTE - ATTENDING CONTRIBUTION TO CARE
RGUJRAL 26yo male hx abdominal surgery as an infant presents with abdominal pain, n/v/d x 2 days. states he ate a sandwich saturday night and on sunday woke up with n/v/d multiple episodes. no blood in stool. Pt complains of mild pain in abdomen diffusely. RGUJRAL 24yo male hx abdominal surgery as an infant presents with abdominal pain, n/v/d x 2 days. states he ate a sandwich Saturday night and on Sunday woke up with n/v/d multiple episodes. no blood in stool. Pt complains of mild pain in abdomen diffusely. States he has not been able to tolerate oral due to multiple episodes of vomiting. No fever, recent travel, sick contact. Pt is covid vaccinated.   No cough, URI, recent antibiotics, tick bite.  On exam, Patient is awake, alert and oriented x 3.  Patient is well appearing and in no acute distress.  NCAT  Neck is supple, No LAD.  Lungs are CTA B/L,+S1S2 no murmurs,  Abdomen:Soft nd/+ mild LUQ tenderness +bs no rebound or guarding.  Extremity no edema or calf tender.  Skin with no rash.  Neuro CN3-12 intact. Strength 5/5 in upper and lower extremities. Nml Sensation.Gait normal.   Check labs, CT to eval for symptoms. Pain control and re eval.  Low suspicion for appendicitis, ddx enteritis. IVF and re eval.

## 2021-08-17 NOTE — ED PROVIDER NOTE - OBJECTIVE STATEMENT
25 year old male with history of intestinal surgery? as an infant, otherwise healthy, presenting with abdominal pain and N/V/D x 2d. States having onset of poor PO since Sunday, reports having periumbilical pain as well and "dizziness". +loose BMs, initially "green", now completely liquid. Works at [employer]. +1 episode of subjective chills but denies fevers, bloody stools, hematemesis, recent travel. +COVID vaccine. No sick contacts. Vomited in waiting room.

## 2021-08-17 NOTE — ED PROVIDER NOTE - NS ED ROS FT
Gen: No fever, +chills  CV: No chest pain, no palpitations  HEENT: No sore throat, no hoarseness  Skin: No rash, no color changes  Resp: No SOB, no cough  GI: No constipation, +diarrhea, +nausea, +vomiting  Msk: No back pain, no LE swelling, no extremity pain  : No dysuria, no increased frequency  Neuro: No LOC, no weakness, no numbness

## 2021-08-17 NOTE — ED PROVIDER NOTE - CLINICAL SUMMARY MEDICAL DECISION MAKING FREE TEXT BOX
25 year old male with history of intestinal surgery? as an infant, otherwise healthy, presenting with abdominal pain and N/V/D x 2d. Appears well here, afebrile, tachycardic but otherwise vitals wnl. Very likely gastroenteritis, low suspicion for appy vs obstruction, will obtain labs, IVF/symptom control, will need PO trial but likely dc

## 2021-08-17 NOTE — ED ADULT NURSE NOTE - OBJECTIVE STATEMENT
pt it a 25 y.o. male complaining of abdominal pain since Sunday and N/V/D since yesterday. pt states that he hasn't eaten anything since Sunday and has been having diarrhea and vomiting since last night with his last bout of vomit was while sitting in the waiting room in the ER. pt denies any SOB, chest pain. pt has a history of dizziness when he stands up but went to a cardiologist about it, the pt states that his cardiologist said he has a "bundle block". vitals signs are stable, lung sounds clear bilaterally, no lower extremity edema, skin is dry and intact, abdominal tenderness on left side, bowel sounds present, pt states that he has been having bouts of diarrhea and vomiting. pt denies any other PMH.

## 2021-08-17 NOTE — ED PROVIDER NOTE - NSFOLLOWUPINSTRUCTIONS_ED_ALL_ED_FT
You were seen in the Emergency Department for: vomiting, diarrhea, abdominal pain    For pain/fever, you may take Tylenol (acetaminophen) 650 mg every 6 hours, OR Advil (ibuprofen) 600 mg every 8 hours.    Please follow up with your primary physician as discussed. If you do not have a primary physician or specialist of your needs, please call [phone] to find one convenient for you. At this number you will be able to locate a provider who accepts your insurance, as well as locate the right specialist for your needs.    You should return to the Emergency Department if you feel any new/worsening/persistent symptoms including but not limited to: chest pain, difficulty breathing, loss of consciousness, bleeding, uncontrolled pain, numbness/weakness of a body part

## 2021-08-18 LAB
CULTURE RESULTS: SIGNIFICANT CHANGE UP
SPECIMEN SOURCE: SIGNIFICANT CHANGE UP

## 2021-08-18 NOTE — ED POST DISCHARGE NOTE - DETAILS
8/18/21: Spoke w/ pt and made him aware of UA results as above and could be 2/2 dehydration from gastroenteritis, advised him to f/u with PMD in 1-2 days for repeat sample and possible further workup. Still having LLQ pain, afebrile. Counseled if sxs worsen or develops fever to come to ED. Advised Ucx pending, if + will call w/ results. Pt acknowledged understanding, all questions answered, appreciative of call. - Ricci Garvey PA-C.

## 2021-08-19 ENCOUNTER — EMERGENCY (EMERGENCY)
Facility: HOSPITAL | Age: 26
LOS: 1 days | Discharge: HOME | End: 2021-08-19
Attending: EMERGENCY MEDICINE
Payer: COMMERCIAL

## 2021-08-19 VITALS
TEMPERATURE: 99 F | RESPIRATION RATE: 16 BRPM | SYSTOLIC BLOOD PRESSURE: 127 MMHG | OXYGEN SATURATION: 97 % | HEART RATE: 94 BPM | DIASTOLIC BLOOD PRESSURE: 89 MMHG

## 2021-08-19 VITALS
RESPIRATION RATE: 20 BRPM | OXYGEN SATURATION: 96 % | TEMPERATURE: 98 F | HEART RATE: 104 BPM | HEIGHT: 69 IN | SYSTOLIC BLOOD PRESSURE: 147 MMHG | DIASTOLIC BLOOD PRESSURE: 90 MMHG | WEIGHT: 169.98 LBS

## 2021-08-19 DIAGNOSIS — H04.559 ACQUIRED STENOSIS OF UNSPECIFIED NASOLACRIMAL DUCT: Chronic | ICD-10-CM

## 2021-08-19 DIAGNOSIS — Z87.19 PERSONAL HISTORY OF OTHER DISEASES OF THE DIGESTIVE SYSTEM: Chronic | ICD-10-CM

## 2021-08-19 LAB
ALBUMIN SERPL ELPH-MCNC: 4.3 G/DL — SIGNIFICANT CHANGE UP (ref 3.3–5)
ALP SERPL-CCNC: 66 U/L — SIGNIFICANT CHANGE UP (ref 40–120)
ALT FLD-CCNC: 12 U/L — SIGNIFICANT CHANGE UP (ref 10–45)
ANION GAP SERPL CALC-SCNC: 18 MMOL/L — HIGH (ref 5–17)
APPEARANCE UR: CLEAR — SIGNIFICANT CHANGE UP
AST SERPL-CCNC: 19 U/L — SIGNIFICANT CHANGE UP (ref 10–40)
BACTERIA # UR AUTO: NEGATIVE — SIGNIFICANT CHANGE UP
BASOPHILS # BLD AUTO: 0 K/UL — SIGNIFICANT CHANGE UP (ref 0–0.2)
BASOPHILS NFR BLD AUTO: 0 % — SIGNIFICANT CHANGE UP (ref 0–2)
BILIRUB SERPL-MCNC: 0.5 MG/DL — SIGNIFICANT CHANGE UP (ref 0.2–1.2)
BILIRUB UR-MCNC: NEGATIVE — SIGNIFICANT CHANGE UP
BUN SERPL-MCNC: 11 MG/DL — SIGNIFICANT CHANGE UP (ref 7–23)
CALCIUM SERPL-MCNC: 9.7 MG/DL — SIGNIFICANT CHANGE UP (ref 8.4–10.5)
CHLORIDE SERPL-SCNC: 99 MMOL/L — SIGNIFICANT CHANGE UP (ref 96–108)
CO2 SERPL-SCNC: 25 MMOL/L — SIGNIFICANT CHANGE UP (ref 22–31)
COLOR SPEC: YELLOW — SIGNIFICANT CHANGE UP
CREAT SERPL-MCNC: 0.9 MG/DL — SIGNIFICANT CHANGE UP (ref 0.5–1.3)
DIFF PNL FLD: NEGATIVE — SIGNIFICANT CHANGE UP
EOSINOPHIL # BLD AUTO: 0.07 K/UL — SIGNIFICANT CHANGE UP (ref 0–0.5)
EOSINOPHIL NFR BLD AUTO: 0.9 % — SIGNIFICANT CHANGE UP (ref 0–6)
EPI CELLS # UR: 0 /HPF — SIGNIFICANT CHANGE UP
GLUCOSE SERPL-MCNC: 93 MG/DL — SIGNIFICANT CHANGE UP (ref 70–99)
GLUCOSE UR QL: NEGATIVE — SIGNIFICANT CHANGE UP
HCT VFR BLD CALC: 44.7 % — SIGNIFICANT CHANGE UP (ref 39–50)
HGB BLD-MCNC: 14.9 G/DL — SIGNIFICANT CHANGE UP (ref 13–17)
HYALINE CASTS # UR AUTO: 0 /LPF — SIGNIFICANT CHANGE UP (ref 0–2)
KETONES UR-MCNC: ABNORMAL
LEUKOCYTE ESTERASE UR-ACNC: NEGATIVE — SIGNIFICANT CHANGE UP
LIDOCAIN IGE QN: 22 U/L — SIGNIFICANT CHANGE UP (ref 7–60)
LYMPHOCYTES # BLD AUTO: 1.27 K/UL — SIGNIFICANT CHANGE UP (ref 1–3.3)
LYMPHOCYTES # BLD AUTO: 16.7 % — SIGNIFICANT CHANGE UP (ref 13–44)
MANUAL SMEAR VERIFICATION: SIGNIFICANT CHANGE UP
MCHC RBC-ENTMCNC: 29.4 PG — SIGNIFICANT CHANGE UP (ref 27–34)
MCHC RBC-ENTMCNC: 33.3 GM/DL — SIGNIFICANT CHANGE UP (ref 32–36)
MCV RBC AUTO: 88.3 FL — SIGNIFICANT CHANGE UP (ref 80–100)
METAMYELOCYTES # FLD: 2.6 % — HIGH (ref 0–0)
MONOCYTES # BLD AUTO: 1.14 K/UL — HIGH (ref 0–0.9)
MONOCYTES NFR BLD AUTO: 14.9 % — HIGH (ref 2–14)
NEUTROPHILS # BLD AUTO: 4.81 K/UL — SIGNIFICANT CHANGE UP (ref 1.8–7.4)
NEUTROPHILS NFR BLD AUTO: 57 % — SIGNIFICANT CHANGE UP (ref 43–77)
NEUTS BAND # BLD: 6.1 % — SIGNIFICANT CHANGE UP (ref 0–8)
NITRITE UR-MCNC: NEGATIVE — SIGNIFICANT CHANGE UP
PH UR: 7.5 — SIGNIFICANT CHANGE UP (ref 5–8)
PLAT MORPH BLD: NORMAL — SIGNIFICANT CHANGE UP
PLATELET # BLD AUTO: 298 K/UL — SIGNIFICANT CHANGE UP (ref 150–400)
POTASSIUM SERPL-MCNC: 3.7 MMOL/L — SIGNIFICANT CHANGE UP (ref 3.5–5.3)
POTASSIUM SERPL-SCNC: 3.7 MMOL/L — SIGNIFICANT CHANGE UP (ref 3.5–5.3)
PROT SERPL-MCNC: 7.4 G/DL — SIGNIFICANT CHANGE UP (ref 6–8.3)
PROT UR-MCNC: ABNORMAL
RBC # BLD: 5.06 M/UL — SIGNIFICANT CHANGE UP (ref 4.2–5.8)
RBC # FLD: 12.7 % — SIGNIFICANT CHANGE UP (ref 10.3–14.5)
RBC BLD AUTO: NORMAL — SIGNIFICANT CHANGE UP
RBC CASTS # UR COMP ASSIST: 2 /HPF — SIGNIFICANT CHANGE UP (ref 0–4)
SMUDGE CELLS # BLD: PRESENT — SIGNIFICANT CHANGE UP
SODIUM SERPL-SCNC: 142 MMOL/L — SIGNIFICANT CHANGE UP (ref 135–145)
SP GR SPEC: >1.05 (ref 1.01–1.02)
UROBILINOGEN FLD QL: NEGATIVE — SIGNIFICANT CHANGE UP
VARIANT LYMPHS # BLD: 1.8 % — SIGNIFICANT CHANGE UP (ref 0–6)
WBC # BLD: 7.63 K/UL — SIGNIFICANT CHANGE UP (ref 3.8–10.5)
WBC # FLD AUTO: 7.63 K/UL — SIGNIFICANT CHANGE UP (ref 3.8–10.5)
WBC UR QL: 1 /HPF — SIGNIFICANT CHANGE UP (ref 0–5)

## 2021-08-19 PROCEDURE — 80053 COMPREHEN METABOLIC PANEL: CPT

## 2021-08-19 PROCEDURE — 87086 URINE CULTURE/COLONY COUNT: CPT

## 2021-08-19 PROCEDURE — 85025 COMPLETE CBC W/AUTO DIFF WBC: CPT

## 2021-08-19 PROCEDURE — 96374 THER/PROPH/DIAG INJ IV PUSH: CPT | Mod: XU

## 2021-08-19 PROCEDURE — 83690 ASSAY OF LIPASE: CPT

## 2021-08-19 PROCEDURE — 74177 CT ABD & PELVIS W/CONTRAST: CPT | Mod: 26,MD

## 2021-08-19 PROCEDURE — 99284 EMERGENCY DEPT VISIT MOD MDM: CPT | Mod: 25

## 2021-08-19 PROCEDURE — 99284 EMERGENCY DEPT VISIT MOD MDM: CPT

## 2021-08-19 PROCEDURE — 81001 URINALYSIS AUTO W/SCOPE: CPT

## 2021-08-19 PROCEDURE — 74177 CT ABD & PELVIS W/CONTRAST: CPT | Mod: MD

## 2021-08-19 PROCEDURE — 96375 TX/PRO/DX INJ NEW DRUG ADDON: CPT

## 2021-08-19 RX ORDER — ONDANSETRON 8 MG/1
4 TABLET, FILM COATED ORAL ONCE
Refills: 0 | Status: COMPLETED | OUTPATIENT
Start: 2021-08-19 | End: 2021-08-19

## 2021-08-19 RX ORDER — SUCRALFATE 1 G
1 TABLET ORAL ONCE
Refills: 0 | Status: COMPLETED | OUTPATIENT
Start: 2021-08-19 | End: 2021-08-19

## 2021-08-19 RX ORDER — SODIUM CHLORIDE 9 MG/ML
1000 INJECTION INTRAMUSCULAR; INTRAVENOUS; SUBCUTANEOUS ONCE
Refills: 0 | Status: COMPLETED | OUTPATIENT
Start: 2021-08-19 | End: 2021-08-19

## 2021-08-19 RX ORDER — ONDANSETRON 8 MG/1
1 TABLET, FILM COATED ORAL
Qty: 9 | Refills: 0
Start: 2021-08-19 | End: 2021-08-21

## 2021-08-19 RX ORDER — FAMOTIDINE 10 MG/ML
20 INJECTION INTRAVENOUS ONCE
Refills: 0 | Status: COMPLETED | OUTPATIENT
Start: 2021-08-19 | End: 2021-08-19

## 2021-08-19 RX ADMIN — FAMOTIDINE 20 MILLIGRAM(S): 10 INJECTION INTRAVENOUS at 03:34

## 2021-08-19 RX ADMIN — SODIUM CHLORIDE 1000 MILLILITER(S): 9 INJECTION INTRAMUSCULAR; INTRAVENOUS; SUBCUTANEOUS at 03:36

## 2021-08-19 RX ADMIN — Medication 1 GRAM(S): at 04:54

## 2021-08-19 RX ADMIN — ONDANSETRON 4 MILLIGRAM(S): 8 TABLET, FILM COATED ORAL at 03:34

## 2021-08-19 RX ADMIN — Medication 30 MILLILITER(S): at 03:34

## 2021-08-19 NOTE — ED ADULT NURSE NOTE - OBJECTIVE STATEMENT
patient is a 24 y/o M with hx of a congenital intestinal obstruction and recurrent sinus infections d/c from [facility] ED yesterday who presents to the ED c/o worsening abd pain and n/v/d. patient states that since he was discharged he has been unable to keep anything down and has been having diarrhea at home. patient states that the pain starts in the umbilicus and radiates up to the left and is sharp in nature with pain that waxes and wanes in severity. patient states that he feels like abdomen is "more bloated" than usual.   patient is a&ox4, PERRL. strong peripheral pulses, strong extremities x4. ambulatory with steady gait. respirations even and unlabored with symmetrical chest rise. skin intact  patient denies CP, SOB, h/a, dizziness, weakness, numbness/tingling, vision changes, urinary symptoms, cough, fever, chills, recent travel, or sick contacts  18 g IV placed in patients left AC. bed locked and placed in lowest position with side rails up. patient provided with call bell and educated on use, verbalizes understanding

## 2021-08-19 NOTE — ED PROVIDER NOTE - ATTENDING CONTRIBUTION TO CARE
MD Cavazos:  patient seen and evaluated personally.   I agree with the History & Physical,  Impression & Plan other than what was detailed in my note.  MD Cavazos  26 y/o m w/ three days of nausea vomiting, luq and epigastric pain, non radiating, seen here several  days ago, received fluids, labs, supportive meds and felt better but now returns, pain is moderate, no fevers, no urinary symptoms, afebrile vitals stable  non toxic well appearing, NC/AT,  conjunctiva non conjected, sclera anicteric, moist mucous membranes, neck supple, heart sounds, normal, no mrg, lungs cta b/l no wrr, abd soft non distended w/ ttp in epigastric/luq tenderness, no visual deformities of extremities, axox3, , normal mood and affect. Pt well appearing otherwise but given repeat visit, ttp, will get cbc, cmp, lipase, ct scan abd pelvis.

## 2021-08-19 NOTE — ED PROVIDER NOTE - CLINICAL SUMMARY MEDICAL DECISION MAKING FREE TEXT BOX
Areli Leiva MD, PGY-2: 26YO male hx of abdominal pain, LUQ, x3d, acutely worsening, associated with decreased PO intake, nausea, diarrhea. PE + LUQ TTP. VS stable, normal HR. suspect renal infarct, diverticulitis, pancreatitis, enteritis. plan for basic labs and ct, reassess s/p pain and nausea meds.

## 2021-08-19 NOTE — ED PROVIDER NOTE - NSFOLLOWUPINSTRUCTIONS_ED_ALL_ED_FT
1. take tylenol or motrin as needed for pain, zofran for nausea. 2. today, the lab tests we did include basic labs, urinalysis, the imaging tests we did include ct scan. results showing enteritis (inflammation of the colon). we have included these test results in your paperwork. 3. given that you were in the ED today, we recommend a followup visit with your general doctor (primary care doctor) within 7 days. 4. your diagnosis is: enteritis 5. return to the ED if your current symptoms worsen, if your pain doesn't resolve with tylenol/motrin, if unable to eat/drink for multiple days. 6. we sent medications to your pharmacy (nausea medications), take as prescribed.

## 2021-08-19 NOTE — ED PROVIDER NOTE - OBJECTIVE STATEMENT
26YO male hx of abdominal pain, LUQ, x3d, acutely worsening, associated with decreased PO intake, nausea, diarrhea. pain intermittent, sharp, no association with eating. endorses had a BM yesterday. unclear surgical hx, may have had an intestinal condition as a child that he had surgery for. denies dysuria, hematuria.

## 2021-08-19 NOTE — ED PROVIDER NOTE - NS ED ROS FT
Gen: Denies fevers  CV: Denies chest pain  Resp: Denies SOB, cough  Endo: Denies increased urination  GI: + nausea, abdominal pain  Msk: Denies extremity pain  : Denies dysuria  Neuro: Denies LOC

## 2021-08-19 NOTE — ED PROVIDER NOTE - PHYSICAL EXAMINATION
Gen: WDWN, NAD  HEENT: EOMI, no nasal discharge, mucous membranes moist  CV: RRR, +S1/S2, no M/R/G  Resp: CTAB, no W/R/R  GI: Abdomen soft non-distended, + LUQ TTP  MSK: No open wounds, no bruising, no LE edema  Neuro: A&Ox4, following commands, moving all four extremities spontaneously  Psych: appropriate mood

## 2021-08-19 NOTE — ED PROVIDER NOTE - PATIENT PORTAL LINK FT
You can access the FollowMyHealth Patient Portal offered by [facility] by registering at the following website: [email or web address]. By joining [employer]’s FollowMyHealth portal, you will also be able to view your health information using other applications (apps) compatible with our system.

## 2021-08-20 LAB
CULTURE RESULTS: SIGNIFICANT CHANGE UP
SPECIMEN SOURCE: SIGNIFICANT CHANGE UP

## 2021-08-20 NOTE — ED POST DISCHARGE NOTE - RESULT SUMMARY
pt called for work excuse form. - Arelis Andujar PA-C pt called for work excuse form. reports he is still not feeling well, no new/worsening pain. tolerating fluids but trouble keeping down solid food. advised GI f/u and if having any new/worsening symptoms to return to ER for re-eval. Pt understands. - Arelis Andujar PA-C

## 2021-11-01 ENCOUNTER — APPOINTMENT (OUTPATIENT)
Dept: PSYCHIATRY | Facility: CLINIC | Age: 26
End: 2021-11-01

## 2022-02-09 ENCOUNTER — TRANSCRIPTION ENCOUNTER (OUTPATIENT)
Age: 27
End: 2022-02-09

## 2022-02-10 ENCOUNTER — APPOINTMENT (OUTPATIENT)
Dept: OTOLARYNGOLOGY | Facility: CLINIC | Age: 27
End: 2022-02-10

## 2022-03-01 ENCOUNTER — TRANSCRIPTION ENCOUNTER (OUTPATIENT)
Age: 27
End: 2022-03-01

## 2022-03-09 ENCOUNTER — NON-APPOINTMENT (OUTPATIENT)
Age: 27
End: 2022-03-09

## 2022-03-25 ENCOUNTER — APPOINTMENT (OUTPATIENT)
Dept: OTOLARYNGOLOGY | Facility: CLINIC | Age: 27
End: 2022-03-25

## 2022-03-25 ENCOUNTER — TRANSCRIPTION ENCOUNTER (OUTPATIENT)
Age: 27
End: 2022-03-25

## 2022-04-11 ENCOUNTER — APPOINTMENT (OUTPATIENT)
Dept: OTOLARYNGOLOGY | Facility: CLINIC | Age: 27
End: 2022-04-11
Payer: COMMERCIAL

## 2022-04-11 VITALS
SYSTOLIC BLOOD PRESSURE: 120 MMHG | DIASTOLIC BLOOD PRESSURE: 68 MMHG | TEMPERATURE: 96 F | HEIGHT: 69 IN | WEIGHT: 175 LBS | HEART RATE: 67 BPM | BODY MASS INDEX: 25.92 KG/M2

## 2022-04-11 PROCEDURE — 31231 NASAL ENDOSCOPY DX: CPT

## 2022-04-11 PROCEDURE — 99214 OFFICE O/P EST MOD 30 MIN: CPT | Mod: 25

## 2022-04-11 NOTE — CONSULT LETTER
[Please see my note below.] : Please see my note below. [FreeTextEntry1] : Dear Dr. CASSANDRA RAO \par I had the pleasure of evaluating your patient KAYLEY DONG, thank you for allowing us to participate in their care. please see full note detailing our visit below.\par If you have any questions, please do not hesitate to call me and I would be happy to discuss further. \par \par Dixon Joseph M.D.\par Attending Physician,  \par Department of Otolaryngology - Head and Neck Surgery\par Count includes the [facility] \par Office: [phone]\par Fax: [phone]\par \par

## 2022-04-11 NOTE — PROCEDURE
[FreeTextEntry6] : Procedure performed: Nasal Endoscopy- Diagnostic\par Pre-op indication(s): nasal congestion\par Post-op indication(s): nasal congestion \par Verbal and/or written consent obtained from patient\par Anterior rhinoscopy insufficient to account for symptoms\par Scope #: 3,  flexible fiber optic telescope \par The scope was introduced in the nasal passage between the middle and inferior turbinates to exam the inferior portion of the middle meatus and the fontanelle, as well as the maxillary ostia.  Next, the scope was passed medically and posteriorly to the middle turbinates to examine the sphenoethmoid recess and the superior turbinate region.\par Upon visualization the finders are as follows:\par Nasal Septum: midline septum\par Bilateral - Mucosa: reduced turbinates, Mucous: scant, Polyp: not seen, Inferior Turbinate: boggy, Middle Turbinate: normal, Superior Turbinate: normal, Inferior Meatus: narrow, Middle Meatus: wide open - able to see into all sinuses Super Meatus:normal, Sphenoethmoidal Recess: clear\par  [de-identified] : Procedure performed: laryngeal Endoscopy- Diagnostic\par Pre-op/post op indication: dysphonia\par Verbal and/or written consent obtained from patient, Patient was unable to cooperate with mirror\par Scope #: 3, flexible fiber optic telescope used \par Scope was introduced through the nose passed on the floor of the nose to the nasopharynx and then followed down the soft palate to the lower pharynx. The tongue Base, Larynx, Hypopharynx were examined. +FBOT, vallecular was clear, epiglottis was not deformed, subglottis/ pyriform and posterior pharyngeal walls were clear. No erythema, edema, pooling of secretions, masses or lesions. Airway patent, no foreign body visualized. No glottic/supraglottic edema. True vocal cords, arytenoids, vestibular folds, ventricles, pyriform sinuses, and aryepiglottic folds appear normal bilaterally. Vocal cords mobile with good contact b/l.\par \par

## 2022-04-11 NOTE — ASSESSMENT
[FreeTextEntry1] : Pt with chronic sinusitis and nasal congestion with is refractory to extensive medical management. On exam, sigmoidal septal deviation, inferior turbinate hypertrophy and significant mucosal edema. with thin skull base \par patient follows up status post ESS, septoplasty with turbs 3/18/2020. \par very happy with results of surgery - pressure and breathing \par all sinuses open \par \par pt with hearing loss, with pressure \par - does have some tightness in the neck. May lead to tinnitus and ear pressure \par - Myofascial Release\par - discussed tubes - risk bleeding, infection, TM perf, hearing loss, scarring, tinnitus. he is very sensitive to things in his body and is higher risk of something bothering him after. he will consider \par \par pt also presents with snoring and daytime fatigue\par - Sleep study\par 
Yes

## 2022-04-11 NOTE — HISTORY OF PRESENT ILLNESS
[de-identified] : S/p Septo / turbs / ESS on 3/18/20\par breathing is good\par pt does have minimal sinus pressure \par pt uses astelin PRN\par \par Tinnitus in the R ear that started last year. \par intermittent pressure b.l, left is worse \par feels hearing is a bit down \par no vertigo \par has been to a multitude of ENT about it, feels has not gotten a good answer \par \par Throat pain has resolved\par no problem with speaking or swallowing, breathing, no globus sensation [FreeTextEntry1] : pt following up for sinus infection x end of march, with sinus infections, pressure, nasal congestion s/p augmentin and prednisone with relief but with mild sinus pressure. Pt states sxs are resolving, here to assure no more sinus infection. \par pt is using azelastine, and nasal sprays with mild relief. \par worse with traveling \par \par Pt also with snoring, morning HA, daytime fatigue, not sure if with apneas. \par sometimes will fall asleep when bored. \par taking nasal sprays. \par \par pt also with b/l ear pressure,

## 2022-05-12 ENCOUNTER — APPOINTMENT (OUTPATIENT)
Dept: OTOLARYNGOLOGY | Facility: CLINIC | Age: 27
End: 2022-05-12

## 2022-05-16 ENCOUNTER — APPOINTMENT (OUTPATIENT)
Dept: OTOLARYNGOLOGY | Facility: CLINIC | Age: 27
End: 2022-05-16
Payer: COMMERCIAL

## 2022-05-16 ENCOUNTER — NON-APPOINTMENT (OUTPATIENT)
Age: 27
End: 2022-05-16

## 2022-05-16 VITALS
BODY MASS INDEX: 25.92 KG/M2 | HEIGHT: 69 IN | WEIGHT: 175 LBS | TEMPERATURE: 98 F | DIASTOLIC BLOOD PRESSURE: 66 MMHG | HEART RATE: 76 BPM | SYSTOLIC BLOOD PRESSURE: 126 MMHG

## 2022-05-16 PROCEDURE — 95004 PERQ TESTS W/ALRGNC XTRCS: CPT

## 2022-05-16 PROCEDURE — 31231 NASAL ENDOSCOPY DX: CPT

## 2022-05-16 PROCEDURE — 99214 OFFICE O/P EST MOD 30 MIN: CPT | Mod: 25

## 2022-05-16 NOTE — CONSULT LETTER
[Dear  ___] : Dear  [unfilled], [Consult Letter:] : I had the pleasure of evaluating your patient, [unfilled]. [Please see my note below.] : Please see my note below. [Consult Closing:] : Thank you very much for allowing me to participate in the care of this patient.  If you have any questions, please do not hesitate to contact me. [Sincerely,] : Sincerely, [FreeTextEntry3] : Mir Bal MD\par [facility]\par Otolaryngology and Facial Plastics\par Associated Professor, [employer]\par

## 2022-05-16 NOTE — END OF VISIT
[FreeTextEntry3] : I saw and examined this patient in person. I have discussed with Jaylene Guillen, Physician Assistant, in detail the above note and agree with the above assessment and plan of care.\par

## 2022-05-16 NOTE — HISTORY OF PRESENT ILLNESS
[de-identified] : Patient feels he has started with another sinus infection. He feels mucus in the throat, head pressure and nasal congestion. He thinks any time he travels he gets a sinus infection. He went to [place] two month ago and got a sinus infection. He just recently returned from a road trip this past weekend to [place] and thinks it happens whenever he even travels to a different place. He has been using Afrin for a few days and also Allegra cold and sinus with mild pressure relief. \par He also feels that his ears are recurrently clogged and again he is here today with left ear clogging. He has had this issue with ear clogging for about 2-3 years. He uses Azelastine and Flonase \par He was allergy tested years ago.

## 2022-05-16 NOTE — REVIEW OF SYSTEMS
[Post Nasal Drip] : post nasal drip [Ear Pain] : ear pain [Ear Noises] : ear noises [Nasal Congestion] : nasal congestion [Recurrent Sinus Infections] : recurrent sinus infections [Sinus Pain] : sinus pain [Sinus Pressure] : sinus pressure [Negative] : Heme/Lymph [de-identified] : ear pressure

## 2022-05-16 NOTE — ASSESSMENT
[FreeTextEntry1] : Patient with a history of sinus issues had surgery done by Dr. Joseph in the past sinus wise continues had a recent sinus infection on examination his sinuses look opened he continues to complain of intermittently left ear pressure that seems to be consistent with eustachian tube dysfunction seems to respond to steroids we allergy tested him again again he was negative and again I have no good explanation for his ear symptoms.  I do recommend he follow-up with one of our neuro otologist's for further input if his symptoms were to persist.  Regarding his sinuses they seem to be fine I put him on a Medrol Dosepak temporarily after allergy testing was negative.

## 2022-06-06 ENCOUNTER — NON-APPOINTMENT (OUTPATIENT)
Age: 27
End: 2022-06-06

## 2022-06-29 ENCOUNTER — APPOINTMENT (OUTPATIENT)
Dept: OTOLARYNGOLOGY | Facility: CLINIC | Age: 27
End: 2022-06-29
Payer: COMMERCIAL

## 2022-06-29 VITALS
HEART RATE: 61 BPM | WEIGHT: 175 LBS | HEIGHT: 70 IN | SYSTOLIC BLOOD PRESSURE: 121 MMHG | DIASTOLIC BLOOD PRESSURE: 80 MMHG | BODY MASS INDEX: 25.05 KG/M2

## 2022-06-29 PROCEDURE — 92625 TINNITUS ASSESSMENT: CPT

## 2022-06-29 PROCEDURE — 92567 TYMPANOMETRY: CPT

## 2022-06-29 PROCEDURE — 92557 COMPREHENSIVE HEARING TEST: CPT

## 2022-06-29 PROCEDURE — 92504 EAR MICROSCOPY EXAMINATION: CPT

## 2022-06-29 PROCEDURE — 99213 OFFICE O/P EST LOW 20 MIN: CPT | Mod: 25

## 2022-06-29 RX ORDER — OXYMETAZOLINE HCL 0.05 %
0.05 SPRAY, NON-AEROSOL (ML) NASAL
Qty: 1 | Refills: 0 | Status: COMPLETED | COMMUNITY
Start: 2022-03-09 | End: 2022-06-29

## 2022-06-29 RX ORDER — METHYLPREDNISOLONE 4 MG/1
4 TABLET ORAL
Qty: 1 | Refills: 0 | Status: COMPLETED | COMMUNITY
Start: 2022-03-09 | End: 2022-06-29

## 2022-06-29 RX ORDER — PREDNISONE 20 MG/1
20 TABLET ORAL
Qty: 10 | Refills: 0 | Status: DISCONTINUED | COMMUNITY
Start: 2022-05-01

## 2022-06-29 RX ORDER — METHYLPREDNISOLONE 4 MG/1
4 TABLET ORAL
Qty: 1 | Refills: 1 | Status: COMPLETED | COMMUNITY
Start: 2022-05-16 | End: 2022-06-29

## 2022-06-29 RX ORDER — AZELASTINE HYDROCHLORIDE 137 UG/1
SPRAY, METERED NASAL
Refills: 0 | Status: COMPLETED | COMMUNITY
End: 2022-06-29

## 2022-06-29 RX ORDER — MELOXICAM 15 MG/1
15 TABLET ORAL
Qty: 30 | Refills: 0 | Status: DISCONTINUED | COMMUNITY
Start: 2022-03-01

## 2022-06-29 RX ORDER — AMOXICILLIN AND CLAVULANATE POTASSIUM 875; 125 MG/1; MG/1
875-125 TABLET, COATED ORAL
Qty: 14 | Refills: 1 | Status: COMPLETED | COMMUNITY
Start: 2021-07-12 | End: 2022-06-29

## 2022-06-29 RX ORDER — METHYLPREDNISOLONE 4 MG/1
4 TABLET ORAL
Qty: 21 | Refills: 3 | Status: COMPLETED | COMMUNITY
Start: 2021-07-12 | End: 2022-06-29

## 2022-06-29 RX ORDER — FLUTICASONE PROPIONATE 50 UG/1
50 SPRAY, METERED NASAL DAILY
Qty: 1 | Refills: 2 | Status: COMPLETED | COMMUNITY
Start: 2022-06-06 | End: 2022-06-29

## 2022-06-29 RX ORDER — AMOXICILLIN 875 MG/1
875 TABLET, FILM COATED ORAL
Qty: 20 | Refills: 0 | Status: DISCONTINUED | COMMUNITY
Start: 2022-05-01

## 2022-06-29 NOTE — ASSESSMENT
[FreeTextEntry1] : Patient reports ongoing fluctuating left ear fullness and clogged sensation as well as right tinnitus.  Exam today shows intact normal-appearing bilateral tympanic membranes without effusion or retraction.  I personally ordered and reviewed an audiogram for hearing loss, which was bilateral mostly symmetric downsloping sensorineural hearing loss, with similar thresholds to his audiograms dating back to 2019.  Tympanometry is type A bilaterally.\par \par Provided patient reassurance that no evidence of infection or active eustachian tube dysfunction on today's exam.  Discussed other potential etiologies for symptoms, including TMJ dysfunction or cold mastoid fluid.  We will plan to obtain new temporal bone CT, but if normal would then refer for TMJ physical therapy and consideration of muscle relaxants.  Patient has previously taken cyclobenzaprine in the past without improvement.  He was also previously told that he is not a candidate for ET dilation procedures.  Regarding tinnitus, discussed notched sound therapy clinical trial; may also consider hearing aid evaluation.

## 2022-06-29 NOTE — REASON FOR VISIT
[Subsequent Evaluation] : a subsequent evaluation for [FreeTextEntry2] : right tinnitus and bilateral ear fullness

## 2022-06-29 NOTE — HISTORY OF PRESENT ILLNESS
[de-identified] : 26 year old male former patient of 2019, presents for a follow up for right tinnitus and bilateral ear fullness. History of chronic sinus infections.States has bilateral ear fullness more left ear has to pop his ears every couple of minutes, occasional left otalgia that radiates down, when he is drinking water, in car ear pressure becomes more uncomfortable and hearing is not clear feels like something is over his ear. Patient denies otorrhea, ear infections,dizziness, vertigo, headaches related to hearing.\par

## 2022-06-29 NOTE — PHYSICAL EXAM
[Binocular Microscopic Exam] : Binocular microscopic exam was performed [Normal] : the left tympanic membrane was normal

## 2022-07-05 ENCOUNTER — NON-APPOINTMENT (OUTPATIENT)
Age: 27
End: 2022-07-05

## 2022-07-11 ENCOUNTER — APPOINTMENT (OUTPATIENT)
Dept: CT IMAGING | Facility: IMAGING CENTER | Age: 27
End: 2022-07-11

## 2022-07-11 ENCOUNTER — OUTPATIENT (OUTPATIENT)
Dept: OUTPATIENT SERVICES | Facility: HOSPITAL | Age: 27
LOS: 1 days | End: 2022-07-11
Payer: COMMERCIAL

## 2022-07-11 DIAGNOSIS — H93.13 TINNITUS, BILATERAL: ICD-10-CM

## 2022-07-11 DIAGNOSIS — Z00.8 ENCOUNTER FOR OTHER GENERAL EXAMINATION: ICD-10-CM

## 2022-07-11 DIAGNOSIS — H04.559 ACQUIRED STENOSIS OF UNSPECIFIED NASOLACRIMAL DUCT: Chronic | ICD-10-CM

## 2022-07-11 DIAGNOSIS — Z87.19 PERSONAL HISTORY OF OTHER DISEASES OF THE DIGESTIVE SYSTEM: Chronic | ICD-10-CM

## 2022-07-11 PROCEDURE — 70480 CT ORBIT/EAR/FOSSA W/O DYE: CPT | Mod: 26

## 2022-07-11 PROCEDURE — 70480 CT ORBIT/EAR/FOSSA W/O DYE: CPT

## 2022-07-21 ENCOUNTER — NON-APPOINTMENT (OUTPATIENT)
Age: 27
End: 2022-07-21

## 2022-07-25 ENCOUNTER — APPOINTMENT (OUTPATIENT)
Dept: PEDIATRIC ALLERGY IMMUNOLOGY | Facility: CLINIC | Age: 27
End: 2022-07-25

## 2022-07-25 ENCOUNTER — LABORATORY RESULT (OUTPATIENT)
Age: 27
End: 2022-07-25

## 2022-07-25 VITALS
HEIGHT: 70 IN | WEIGHT: 171 LBS | SYSTOLIC BLOOD PRESSURE: 115 MMHG | BODY MASS INDEX: 24.48 KG/M2 | DIASTOLIC BLOOD PRESSURE: 76 MMHG | HEART RATE: 79 BPM | OXYGEN SATURATION: 98 %

## 2022-07-25 DIAGNOSIS — L85.3 XEROSIS CUTIS: ICD-10-CM

## 2022-07-25 LAB
ALBUMIN SERPL ELPH-MCNC: 4.7 G/DL
ALP BLD-CCNC: 71 U/L
ALT SERPL-CCNC: 17 U/L
ANION GAP SERPL CALC-SCNC: 10 MMOL/L
AST SERPL-CCNC: 18 U/L
BASOPHILS # BLD AUTO: 0.02 K/UL
BASOPHILS NFR BLD AUTO: 0.3 %
BILIRUB SERPL-MCNC: 0.4 MG/DL
BUN SERPL-MCNC: 14 MG/DL
CALCIUM SERPL-MCNC: 9.5 MG/DL
CHLORIDE SERPL-SCNC: 105 MMOL/L
CO2 SERPL-SCNC: 27 MMOL/L
CREAT SERPL-MCNC: 0.84 MG/DL
EGFR: 122 ML/MIN/1.73M2
EOSINOPHIL # BLD AUTO: 0.07 K/UL
EOSINOPHIL NFR BLD AUTO: 1 %
GLUCOSE SERPL-MCNC: 95 MG/DL
HCT VFR BLD CALC: 48.5 %
HGB BLD-MCNC: 15.9 G/DL
IMM GRANULOCYTES NFR BLD AUTO: 0.1 %
LYMPHOCYTES # BLD AUTO: 2.16 K/UL
LYMPHOCYTES NFR BLD AUTO: 30 %
MAN DIFF?: NORMAL
MCHC RBC-ENTMCNC: 30.3 PG
MCHC RBC-ENTMCNC: 32.8 GM/DL
MCV RBC AUTO: 92.6 FL
MONOCYTES # BLD AUTO: 0.57 K/UL
MONOCYTES NFR BLD AUTO: 7.9 %
NEUTROPHILS # BLD AUTO: 4.38 K/UL
NEUTROPHILS NFR BLD AUTO: 60.7 %
PLATELET # BLD AUTO: 281 K/UL
POTASSIUM SERPL-SCNC: 4.7 MMOL/L
PROT SERPL-MCNC: 6.9 G/DL
RBC # BLD: 5.24 M/UL
RBC # FLD: 12.4 %
SODIUM SERPL-SCNC: 142 MMOL/L
WBC # FLD AUTO: 7.21 K/UL

## 2022-07-25 PROCEDURE — 36415 COLL VENOUS BLD VENIPUNCTURE: CPT

## 2022-07-25 PROCEDURE — 95004 PERQ TESTS W/ALRGNC XTRCS: CPT

## 2022-07-25 PROCEDURE — 99244 OFF/OP CNSLTJ NEW/EST MOD 40: CPT | Mod: 25

## 2022-07-25 NOTE — CONSULT LETTER
[Dear  ___] : Dear  [unfilled], [Consult Letter:] : I had the pleasure of evaluating your patient, [unfilled]. [Please see my note below.] : Please see my note below. [Consult Closing:] : Thank you very much for allowing me to participate in the care of this patient.  If you have any questions, please do not hesitate to contact me. [Sincerely,] : Sincerely, [FreeTextEntry2] : Dr. Cruzito Gupta [FreeTextEntry3] : Flakita Song MD\par Attending Physician, Division of Allergy and Immunology\par , Departments of Medicine and Pediatrics\par [employer] [facility]\par [facility] \par [facility]

## 2022-07-25 NOTE — SOCIAL HISTORY
[Bedroom] :  in bedroom [Living Area] : in living area [None] : none [FreeTextEntry2] : [facility] [Smokers in Household] : there are no smokers in the home

## 2022-07-25 NOTE — REVIEW OF SYSTEMS
[Nasal Congestion] : nasal congestion [Dry Skin] : ~L dry skin [Recurrent Sinus Infections] : recurrent sinus infections [Nl] : Genitourinary

## 2022-07-25 NOTE — HISTORY OF PRESENT ILLNESS
[Asthma] : asthma [de-identified] : 26 year old male presents with chronic recurrent sinusitis, ear fullness, chronic rhinitis (followed by ENT) and dry skin:\par \par Patient has h/o chronic recurrent sinusitis with sinus pressure, yellow nasal discharge, and headaches for the past  >10 years. No fever. Sinus infections are sometimes followed by bronchitis. Reports treatment with amoxicillin or Z-pack every other month, usual by urgent care.\par Patient report symptoms of chronic nasal congestion, sometimes has itchy eyes. He takes Allegra with limited symptom relief. He uses Fluticasone nose spray, sometimes Azelastine. \par Patient has no pets, exposure to carpets, no smoker in the household. He has no nasal polyposis, is s/p Septo / turbs / ESS on 3/18/20.\par No pneumonia, no recurrent ear infection, no yeast infection, chronic diarrhea, recurrent Strep throat.\par He is followed by ENT. 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.\par CT of the sinuses from 2020 demonstrated: mucosal thickening, left hilar nausea cell with associated mucosal thickening obstructing the left frontal recess, the right frontal recess obstructed by mucosal thickening, horizontal septae inserting near the sinus ostium, ostiomeatal complexes obscured b/l laterally by mucosal thickening, sigmoid nasal septal deviation, left lateral nasal bone spur abutting the lateral nasal wall, left sphenoethmoidal recess obstructed, mucosal thickening within the right sphenoethmoidal recess, right-sided Onodi cell, 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

## 2022-07-25 NOTE — PHYSICAL EXAM
[Alert] : alert [Well Nourished] : well nourished [Healthy Appearance] : healthy appearance [No Acute Distress] : no acute distress [Well Developed] : well developed [Normal Pupil & Iris Size/Symmetry] : normal pupil and iris size and symmetry [No Discharge] : no discharge [No Photophobia] : no photophobia [Sclera Not Icteric] : sclera not icteric [Normal Outer Ear/Nose] : the ears and nose were normal in appearance [Supple] : the neck was supple [Normal Rate and Effort] : normal respiratory rhythm and effort [No Crackles] : no crackles [No Retractions] : no retractions [Bilateral Audible Breath Sounds] : bilateral audible breath sounds [Normal Rate] : heart rate was normal  [Normal S1, S2] : normal S1 and S2 [No murmur] : no murmur [Regular Rhythm] : with a regular rhythm [Not Distended] : not distended [Normal Cervical Lymph Nodes] : cervical [Skin Intact] : skin intact  [No Rash] : no rash [No Skin Lesions] : no skin lesions [No clubbing] : no clubbing [No Edema] : no edema [No Cyanosis] : no cyanosis [Normal Mood] : mood was normal [Normal Affect] : affect was normal [Alert, Awake, Oriented as Age-Appropriate] : alert, awake, oriented as age appropriate [Conjunctival Erythema] : no conjunctival erythema [Wheezing] : no wheezing was heard [de-identified] : dry skin

## 2022-07-25 NOTE — REASON FOR VISIT
[Initial Consultation] : an initial consultation for [FreeTextEntry2] : chronic recurrent sinusitis, chronic rhinitis, dry skin

## 2022-07-26 LAB
A ALTERNATA IGE QN: <0.1 KUA/L
A FUMIGATUS IGE QN: <0.1 KUA/L
C HERBARUM IGE QN: <0.1 KUA/L
CAT DANDER IGE QN: <0.1 KUA/L
CD16+CD56+ CELLS # BLD: 153 /UL
CD16+CD56+ CELLS NFR BLD: 7 %
CD19 CELLS NFR BLD: 174 /UL
CD3 CELLS # BLD: 1831 /UL
CD3 CELLS NFR BLD: 84 %
CD3+CD4+ CELLS # BLD: 741 /UL
CD3+CD4+ CELLS NFR BLD: 34 %
CD3+CD4+ CELLS/CD3+CD8+ CLL SPEC: 0.79 RATIO
CD3+CD8+ CELLS # SPEC: 942 /UL
CD3+CD8+ CELLS NFR BLD: 44 %
CELLS.CD3-CD19+/CELLS IN BLOOD: 8 %
CH50 SERPL-MCNC: 70 U/ML
CMN PIGWEED IGE QN: <0.1 KUA/L
COCKLEBUR IGE QN: <0.1 KUA/L
COCKSFOOT IGE QN: <0.1 KUA/L
COMMON RAGWEED IGE QN: <0.1 KUA/L
D FARINAE IGE QN: <0.1 KUA/L
D PTERONYSS IGE QN: <0.1 KUA/L
DEPRECATED A ALTERNATA IGE RAST QL: 0
DEPRECATED A FUMIGATUS IGE RAST QL: 0
DEPRECATED A PULLULANS IGE RAST QL: 0
DEPRECATED C HERBARUM IGE RAST QL: 0
DEPRECATED CAT DANDER IGE RAST QL: 0
DEPRECATED COCKLEBUR IGE RAST QL: 0
DEPRECATED COCKSFOOT IGE RAST QL: 0
DEPRECATED COMMON PIGWEED IGE RAST QL: 0
DEPRECATED COMMON RAGWEED IGE RAST QL: 0
DEPRECATED D FARINAE IGE RAST QL: 0
DEPRECATED D PTERONYSS IGE RAST QL: 0
DEPRECATED DOG DANDER IGE RAST QL: 0
DEPRECATED ENGL PLANTAIN IGE RAST QL: 0
DEPRECATED F MONILIFORME IGE RAST QL: 0
DEPRECATED GIANT RAGWEED IGE RAST QL: 0
DEPRECATED GOOSE FEATHER IGE RAST QL: 0
DEPRECATED GOOSEFOOT IGE RAST QL: 0
DEPRECATED KAPPA LC FREE/LAMBDA SER: 1.24 RATIO
DEPRECATED KENT BLUE GRASS IGE RAST QL: 0
DEPRECATED LONDON PLANE IGE RAST QL: 0
DEPRECATED MUGWORT IGE RAST QL: 0
DEPRECATED P NOTATUM IGE RAST QL: 0
DEPRECATED RED CEDAR IGE RAST QL: 0
DEPRECATED RED TOP GRASS IGE RAST QL: 0
DEPRECATED ROACH IGE RAST QL: 0
DEPRECATED SILVER BIRCH IGE RAST QL: 0
DEPRECATED TIMOTHY IGE RAST QL: 0
DEPRECATED WHITE ASH IGE RAST QL: 0
DEPRECATED WHITE HICKORY IGE RAST QL: 0
DEPRECATED WHITE OAK IGE RAST QL: 0
DOG DANDER IGE QN: <0.1 KUA/L
ENGL PLANTAIN IGE QN: <0.1 KUA/L
F MONILIFORME IGE QN: <0.1 KUA/L
GIANT RAGWEED IGE QN: <0.1 KUA/L
GOOSE FEATHER IGE QN: <0.1 KUA/L
GOOSEFOOT IGE QN: <0.1 KUA/L
GRAY ALDER (T2) CLASS: 0
GRAY ALDER (T2) CONC: <0.1 KUA/L
IGA SER QL IEP: 342 MG/DL
IGG SER QL IEP: 923 MG/DL
IGM SER QL IEP: 106 MG/DL
KAPPA LC CSF-MCNC: 1.38 MG/DL
KAPPA LC SERPL-MCNC: 1.71 MG/DL
KENT BLUE GRASS IGE QN: <0.1 KUA/L
LONDON PLANE IGE QN: <0.1 KUA/L
MOLD (AUREOBASIDIUM M12) CONC: <0.1 KUA/L
MUGWORT IGE QN: <0.1 KUA/L
MULBERRY (T70) CLASS: 0
MULBERRY (T70) CONC: <0.1 KUA/L
P NOTATUM IGE QN: <0.1 KUA/L
RED CEDAR IGE QN: <0.1 KUA/L
RED TOP GRASS IGE QN: <0.1 KUA/L
ROACH IGE QN: <0.1 KUA/L
SILVER BIRCH IGE QN: <0.1 KUA/L
TIMOTHY IGE QN: <0.1 KUA/L
TOTAL IGE SMQN RAST: 5 KU/L
WHITE ASH IGE QN: <0.1 KUA/L
WHITE ELM IGE QN: 0
WHITE ELM IGE QN: <0.1 KUA/L
WHITE HICKORY IGE QN: <0.1 KUA/L
WHITE OAK IGE QN: <0.1 KUA/L

## 2022-07-27 LAB
C LUNATA IGE QN: <0.1 KUA/L
DEPRECATED C LUNATA IGE RAST QL: 0
DEPRECATED R NIGRICANS IGE RAST QL: 0
R NIGRICANS IGE QN: <0.1 KUA/L

## 2022-07-28 LAB
C DIPHTHERIAE AB SER QL: 1.01 IU/ML
C TETANI IGG SER-ACNC: 2.56 IU/ML
DEPRECATED RYE IGE RAST QL: 0
HAEM INFLU B AB SER-MCNC: 5.95 UG/ML
RYE IGE QN: <0.1 KUA/L

## 2022-08-01 LAB — MANNAN BINDING LECTIN (MBL): 1044 NG/ML

## 2022-08-05 ENCOUNTER — NON-APPOINTMENT (OUTPATIENT)
Age: 27
End: 2022-08-05

## 2022-08-05 LAB
COMPLEMENT, ALTERNATE PATHWAY (AH50): 82
DEPRECATED S PNEUM 1 IGG SER-MCNC: 5.7 MCG/ML
DEPRECATED S PNEUM12 AB SER-ACNC: 3.3 MCG/ML
DEPRECATED S PNEUM14 AB SER-ACNC: 1 MCG/ML
DEPRECATED S PNEUM17 IGG SER IA-MCNC: 4.6 MCG/ML
DEPRECATED S PNEUM18 IGG SER IA-MCNC: 6.5 MCG/ML
DEPRECATED S PNEUM19 IGG SER-MCNC: 3.6 MCG/ML
DEPRECATED S PNEUM19 IGG SER-MCNC: 5.3 MCG/ML
DEPRECATED S PNEUM2 IGG SER-MCNC: 0.8 MCG/ML
DEPRECATED S PNEUM20 IGG SER-MCNC: 0.7 MCG/ML
DEPRECATED S PNEUM22 IGG SER-MCNC: 16.6 MCG/ML
DEPRECATED S PNEUM23 AB SER-ACNC: 28.8 MCG/ML
DEPRECATED S PNEUM3 AB SER-ACNC: 4.1 MCG/ML
DEPRECATED S PNEUM34 IGG SER-MCNC: 4.7 MCG/ML
DEPRECATED S PNEUM4 AB SER-ACNC: 2.9 MCG/ML
DEPRECATED S PNEUM5 IGG SER-MCNC: 12.1 MCG/ML
DEPRECATED S PNEUM6 IGG SER-MCNC: 5.1 MCG/ML
DEPRECATED S PNEUM7 IGG SER-ACNC: 3.6 MCG/ML
DEPRECATED S PNEUM8 AB SER-ACNC: 3.1 MCG/ML
DEPRECATED S PNEUM9 AB SER-ACNC: NORMAL MCG/ML
DEPRECATED S PNEUM9 IGG SER-MCNC: 9 MCG/ML
LPT PW BLD-NRATE: NORMAL
LPT PW BLD-NRATE: NORMAL
STREPTOCOCCUS PNEUMONIAE SEROTYPE 11A: 1.8 MCG/ML
STREPTOCOCCUS PNEUMONIAE SEROTYPE 15B: 0.7 MCG/ML
STREPTOCOCCUS PNEUMONIAE SEROTYPE 33F: 0.4 MCG/ML

## 2022-08-06 ENCOUNTER — NON-APPOINTMENT (OUTPATIENT)
Age: 27
End: 2022-08-06

## 2022-08-08 ENCOUNTER — TRANSCRIPTION ENCOUNTER (OUTPATIENT)
Age: 27
End: 2022-08-08

## 2022-08-09 ENCOUNTER — NON-APPOINTMENT (OUTPATIENT)
Age: 27
End: 2022-08-09

## 2022-08-25 ENCOUNTER — NON-APPOINTMENT (OUTPATIENT)
Age: 27
End: 2022-08-25

## 2022-10-04 ENCOUNTER — NON-APPOINTMENT (OUTPATIENT)
Age: 27
End: 2022-10-04

## 2022-11-03 ENCOUNTER — APPOINTMENT (OUTPATIENT)
Dept: OTOLARYNGOLOGY | Facility: CLINIC | Age: 27
End: 2022-11-03

## 2022-11-03 DIAGNOSIS — H61.22 IMPACTED CERUMEN, LEFT EAR: ICD-10-CM

## 2022-11-03 PROCEDURE — 99213 OFFICE O/P EST LOW 20 MIN: CPT | Mod: 25

## 2022-11-03 PROCEDURE — G0268 REMOVAL OF IMPACTED WAX MD: CPT

## 2022-11-03 PROCEDURE — 92557 COMPREHENSIVE HEARING TEST: CPT

## 2022-11-03 PROCEDURE — 92567 TYMPANOMETRY: CPT

## 2022-11-03 NOTE — ASSESSMENT
[FreeTextEntry1] : Reports worsened left ear clogged sensation.  Try TMJ physiotherapy and has been doing exercises with minimal improvement.  Otoscopic exam today shows intact bilateral tympanic membranes without effusion or retraction after cerumen removal from left ear.  I personally ordered and reviewed a new audiogram for his hearing loss, which shows stable bilateral downsloping sensorineural hearing loss.\par \par Provided patient reassurance no evidence of change in hearing.  Recommended continuation of monitoring hearing annually.  Given normal tympanometry once again today combined with recent CT showing no middle ear or mastoid fluid, counseled patient that his ear fullness is not likely secondary to ETD D, but is rather more likely a manifestation of his underlying hearing loss and/or TMJ dysfunction.

## 2022-11-03 NOTE — HISTORY OF PRESENT ILLNESS
[de-identified] : 26 y/o M presents for follow up for tinnitus and ear fullness\par reports that ear fullness has gotten worst in left ear over the past 2 weeks\par denies any new issues with pain or drainage

## 2022-11-29 ENCOUNTER — APPOINTMENT (OUTPATIENT)
Dept: OTOLARYNGOLOGY | Facility: CLINIC | Age: 27
End: 2022-11-29

## 2022-11-29 PROCEDURE — 92517 VEMP TEST I&R CERVICAL: CPT

## 2022-12-07 ENCOUNTER — APPOINTMENT (OUTPATIENT)
Dept: OTOLARYNGOLOGY | Facility: CLINIC | Age: 27
End: 2022-12-07

## 2022-12-07 ENCOUNTER — NON-APPOINTMENT (OUTPATIENT)
Age: 27
End: 2022-12-07

## 2022-12-07 PROCEDURE — 99442: CPT

## 2022-12-15 ENCOUNTER — NON-APPOINTMENT (OUTPATIENT)
Age: 27
End: 2022-12-15

## 2022-12-19 ENCOUNTER — NON-APPOINTMENT (OUTPATIENT)
Age: 27
End: 2022-12-19

## 2022-12-19 ENCOUNTER — APPOINTMENT (OUTPATIENT)
Dept: OTOLARYNGOLOGY | Facility: CLINIC | Age: 27
End: 2022-12-19

## 2022-12-19 VITALS — HEIGHT: 70 IN | TEMPERATURE: 97.3 F | WEIGHT: 170 LBS | BODY MASS INDEX: 24.34 KG/M2

## 2022-12-19 PROCEDURE — 99214 OFFICE O/P EST MOD 30 MIN: CPT | Mod: 25

## 2022-12-19 PROCEDURE — 31231 NASAL ENDOSCOPY DX: CPT

## 2022-12-19 NOTE — HISTORY OF PRESENT ILLNESS
[de-identified] : S/p Septo / turbs / ESS on 3/18/20\par breathing is good\par pt does have minimal sinus pressure \par pt uses astelin PRN\par \par Tinnitus in the R ear that started last year. \par intermittent pressure b.l, left is worse \par feels hearing is a bit down \par no vertigo \par has been to a multitude of ENT about it, feels has not gotten a good answer \par \par Throat pain has resolved\par no problem with speaking or swallowing, breathing, no globus sensation\par \par pt following up for sinus infection x end of march, with sinus infections, pressure, nasal congestion s/p augmentin and prednisone with relief but with mild sinus pressure. Pt states sxs are resolving, here to assure no more sinus infection. \par pt is using azelastine, and nasal sprays with mild relief. \par worse with traveling \par \par Pt also with snoring, morning HA, daytime fatigue, not sure if with apneas. \par sometimes will fall asleep when bored. \par taking nasal sprays. \par \par pt also with b/l ear pressure,  [FreeTextEntry1] : Pt went to urgent care and was put on azithromycin for sinus infection this month. States that does not feel stuffiness-more so sinus pressure. States has had 3 sinus infections within the year with associated sx of sinus pressure and ear pressure. \par \par Ear pressure b/l x4 years ago. States that audiology gave him [employer] hearing aids that seemed to help with ear pressure. no Vertigo, tinnitus, pain, drainage or facial weakness.\par

## 2022-12-19 NOTE — ASSESSMENT
[FreeTextEntry1] : Pt with chronic sinusitis and nasal congestion with is refractory to extensive medical management. On exam, sigmoidal septal deviation, inferior turbinate hypertrophy and significant mucosal edema. with thin skull base \par patient now status post ESS, septoplasty with turbs 3/18/2020. \par Today following up after possible viral sinus infection. Was put on azithromycin by urgent care. Associated sx are sinus pressure, denies stuffiness. On exam today, scope was clear. No sign of infection\par - Flonase. A topical steroid reduce mucosal swelling, illustrated appropriate use and how to reduce the risk of bleeding \par - Nasal irrigation and showed how to use it to maximize effectiveness \par \par Pt also with b/l ear fullness/pressure. Recommend psych to pt to see if this helps as feels he is hyperfocusing on the ear pressure and it is distracting to him \par does have SSC dehiscence -  not doing surge at this point, possibly cause of pressure

## 2022-12-19 NOTE — CONSULT LETTER
[Please see my note below.] : Please see my note below. [FreeTextEntry1] : Dear Dr. CASSANDRA RAO \par I had the pleasure of evaluating your patient KAYLEY DONG, thank you for allowing us to participate in their care. please see full note detailing our visit below.\par If you have any questions, please do not hesitate to call me and I would be happy to discuss further. \par \par Dixon Joseph M.D.\par Attending Physician,  \par Department of Otolaryngology - Head and Neck Surgery\par [facility] \par Office: [phone]\par Fax: [phone]\par \par

## 2022-12-19 NOTE — PROCEDURE
[FreeTextEntry6] : Procedure performed: Nasal Endoscopy- Diagnostic\par Pre-op indication(s): nasal congestion\par Post-op indication(s): nasal congestion \par Verbal and/or written consent obtained from patient\par Anterior rhinoscopy insufficient to account for symptoms\par Scope #: 3,  flexible fiber optic telescope \par The scope was introduced in the nasal passage between the middle and inferior turbinates to exam the inferior portion of the middle meatus and the fontanelle, as well as the maxillary ostia.  Next, the scope was passed medically and posteriorly to the middle turbinates to examine the sphenoethmoid recess and the superior turbinate region.\par Upon visualization the finders are as follows:\par Nasal Septum: midline septum\par Bilateral - Mucosa: reduced turbinates, Mucous: scant, Polyp: not seen, Inferior Turbinate: boggy, Middle Turbinate: normal, Superior Turbinate: normal, Inferior Meatus: narrow, Middle Meatus: wide open - able to see into all sinuses Super Meatus:normal, Sphenoethmoidal Recess: clear\par  [de-identified] : \par \par

## 2023-01-20 ENCOUNTER — APPOINTMENT (OUTPATIENT)
Dept: OTOLARYNGOLOGY | Facility: CLINIC | Age: 28
End: 2023-01-20
Payer: COMMERCIAL

## 2023-01-20 DIAGNOSIS — H93.13 TINNITUS, BILATERAL: ICD-10-CM

## 2023-01-20 PROCEDURE — 92504 EAR MICROSCOPY EXAMINATION: CPT

## 2023-01-20 PROCEDURE — 99213 OFFICE O/P EST LOW 20 MIN: CPT | Mod: 25

## 2023-01-20 PROCEDURE — 92567 TYMPANOMETRY: CPT

## 2023-01-20 PROCEDURE — 92557 COMPREHENSIVE HEARING TEST: CPT

## 2023-01-20 NOTE — ASSESSMENT
[FreeTextEntry1] : Reports intermittent fluctuating worsened fullness and otalgia.  Otoscopic exam today shows intact bilateral tympanic membranes without effusion or retraction and normal-appearing ear canals.  I personally ordered and reviewed an audiogram for his hearing loss which shows stable bilateral high-frequency sensorineural loss with type A tympanograms bilaterally.\par \par Provided patient reassurance no evidence of acute hearing change or active eustachian tube dysfunction today.  Recommended trial of hearing aids which she plans to pursue.  Monitor hearing annually, follow-up sooner with any change in ear symptoms.

## 2023-01-21 ENCOUNTER — NON-APPOINTMENT (OUTPATIENT)
Age: 28
End: 2023-01-21

## 2023-03-02 ENCOUNTER — NON-APPOINTMENT (OUTPATIENT)
Age: 28
End: 2023-03-02

## 2023-03-07 ENCOUNTER — APPOINTMENT (OUTPATIENT)
Dept: OTOLARYNGOLOGY | Facility: CLINIC | Age: 28
End: 2023-03-07

## 2023-03-23 ENCOUNTER — APPOINTMENT (OUTPATIENT)
Dept: OTOLARYNGOLOGY | Facility: CLINIC | Age: 28
End: 2023-03-23
Payer: COMMERCIAL

## 2023-03-23 VITALS
BODY MASS INDEX: 24.34 KG/M2 | HEIGHT: 70 IN | HEART RATE: 66 BPM | SYSTOLIC BLOOD PRESSURE: 119 MMHG | DIASTOLIC BLOOD PRESSURE: 78 MMHG | WEIGHT: 170 LBS

## 2023-03-23 PROCEDURE — 99214 OFFICE O/P EST MOD 30 MIN: CPT

## 2023-03-23 PROCEDURE — 92504 EAR MICROSCOPY EXAMINATION: CPT

## 2023-03-23 PROCEDURE — 92567 TYMPANOMETRY: CPT | Mod: 22

## 2023-03-23 RX ORDER — PREDNISONE 10 MG/1
10 TABLET ORAL DAILY
Qty: 10 | Refills: 0 | Status: DISCONTINUED | COMMUNITY
Start: 2022-12-15 | End: 2023-03-23

## 2023-03-23 RX ORDER — FLUTICASONE PROPIONATE 50 UG/1
50 SPRAY, METERED NASAL DAILY
Qty: 3 | Refills: 3 | Status: DISCONTINUED | COMMUNITY
Start: 2021-04-30 | End: 2023-03-23

## 2023-03-23 RX ORDER — AMOXICILLIN AND CLAVULANATE POTASSIUM 875; 125 MG/1; MG/1
875-125 TABLET, COATED ORAL
Qty: 20 | Refills: 0 | Status: DISCONTINUED | COMMUNITY
Start: 2022-08-08 | End: 2023-03-23

## 2023-03-23 RX ORDER — METHYLPREDNISOLONE 4 MG/1
4 TABLET ORAL
Qty: 1 | Refills: 0 | Status: DISCONTINUED | COMMUNITY
Start: 2022-10-04 | End: 2023-03-23

## 2023-03-23 RX ORDER — MELOXICAM 7.5 MG/1
7.5 TABLET ORAL
Qty: 15 | Refills: 0 | Status: DISCONTINUED | COMMUNITY
Start: 2022-07-06 | End: 2023-03-23

## 2023-03-23 NOTE — PROCEDURE
[Same] : same as the Pre Op Dx. [] : Binocular Microscopy [FreeTextEntry1] : fullness [FreeTextEntry4] : none [FreeTextEntry6] : Operative microscope was used to examine the ear canal, ear drum and visible middle ear landmarks. Adequate exam would not have been possible without the use of a microscope. Findings are described.\par \par

## 2023-03-23 NOTE — HISTORY OF PRESENT ILLNESS
[de-identified] : 27 year old man presents for initial evaluation of intermittent bilateral ear fullness that has been present for 3-4 years. \par Previously has seen Dr. Gupta--recommended hearing aids \par States the pressure varies in intensity and can be extremely bothersome.\par Reports intermittent left otalgia when ear pressure is high. \par Reports intermittent right tinnitus--bothersome when the pitch is elevated \par Denies otalgia, otorrhea, recent fevers or ear infections, changes in hearing, dizziness, vertigo, headaches related to hearing. \par Last audio 01/20/2023

## 2023-04-04 ENCOUNTER — APPOINTMENT (OUTPATIENT)
Dept: PHARMACY | Facility: CLINIC | Age: 28
End: 2023-04-04
Payer: SELF-PAY

## 2023-04-04 PROCEDURE — V5010 ASSESSMENT FOR HEARING AID: CPT | Mod: NC

## 2023-04-05 ENCOUNTER — APPOINTMENT (OUTPATIENT)
Dept: PHARMACY | Facility: CLINIC | Age: 28
End: 2023-04-05

## 2023-04-27 ENCOUNTER — APPOINTMENT (OUTPATIENT)
Dept: PSYCHIATRY | Facility: CLINIC | Age: 28
End: 2023-04-27
Payer: COMMERCIAL

## 2023-04-27 PROCEDURE — 90791 PSYCH DIAGNOSTIC EVALUATION: CPT

## 2023-04-28 NOTE — DISCUSSION/SUMMARY
[No] : No [FreeTextEntry1] : Patient experiencing depressed mood and anxiety with recent break up. Difficulty identifying and expressing emotions and ear of opening up about his sexuality.

## 2023-04-28 NOTE — SOCIAL HISTORY
[FreeTextEntry1] : Patient states that he has a few friends and had recent relationship that lasted one year. 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

## 2023-04-28 NOTE — PHYSICAL EXAM
[Cooperative] : cooperative [Anxious] : anxious [Constricted] : constricted [Clear] : clear [Linear/Goal Directed] : linear/goal directed [None Reported] : none reported [Average] : average [WNL] : within normal limits

## 2023-04-28 NOTE — FAMILY HISTORY
[FreeTextEntry1] : Patient reports that mother has medical and mental health issues including history of cutting herself and was in treatment but he does not know the details. He is not aware of any mental health issues for father.

## 2023-04-28 NOTE — PSYCHOSOCIAL ASSESSMENT
[None known] : None known [Other: _____] : [unfilled] [No] : Have you ever experienced this type of event? No [Competitive and integrated employment] : Competitive and integrated employment [35 hours or more] : 35 hours or more [FreeTextEntry2] : Patient functions well in work setting but still snot always communicate needs.  [FreeTextEntry3] : Few friends. recent relationship that just ended. Insecure about sexuality. Functions well at work.  [FreeTextEntry1] : Patient having trouble opening up to family about sexuality as he does not believe that they will be accepting of this and has worst case scenario thought of mother killing herself.

## 2023-04-28 NOTE — REASON FOR VISIT
[Self-Referred] : Self-Referred [[facility] Provider/Facility] : [facility] Provider/Facility [Patient] : Patient [FreeTextEntry1] : Patient reports symptoms of anxiety, depression and panic attacks. Also relationship issues and difficulty coming out to parents.

## 2023-04-28 NOTE — HISTORY OF PRESENT ILLNESS
[FreeTextEntry1] : Patient states that he has had anxiety issues most of his life but current trigger is break up with boyfriend. Patient states that he has had symptoms of anxiety and depression since childhood. he stated that parents fought all the time and he acted out until they did family therapy and parents started restraining him and he then stopped expressing his feelings and internalized them. In recent years he has experienced severe anxiety at times where he finds it very hard to concentrate on anything and does not express his feelings. He has not been able to come out to his parents or work and is out only to a few friends but he feels that they probably  him negatively as he still judges himself. His boyfriend of one year just broke up with him because he would not come out to family and would not express his feelings.  [FreeTextEntry2] : Patient states that only treatment he had was two sessions with Dr. Frederick in 2020 but he was too embarrassed to speak about his issues and he did not continue with treatment and did not take medication.

## 2023-05-01 ENCOUNTER — APPOINTMENT (OUTPATIENT)
Dept: PSYCHIATRY | Facility: CLINIC | Age: 28
End: 2023-05-01
Payer: COMMERCIAL

## 2023-05-01 PROCEDURE — 90834 PSYTX W PT 45 MINUTES: CPT

## 2023-05-02 NOTE — PHYSICAL EXAM
[Cooperative] : cooperative [Anxious] : anxious [Constricted] : constricted [Clear] : clear [Linear/Goal Directed] : linear/goal directed [None] : none [None Reported] : none reported [Average] : average [WNL] : within normal limits

## 2023-05-02 NOTE — PLAN
[Cognitive and/or Behavior Therapy] : Cognitive and/or Behavior Therapy  [[facility]] : [facility]  [Recommended Frequency of Visits: ____] : Recommended frequency of visits: [unfilled] [Return in ____ week(s)] : Return in [unfilled] week(s) [FreeTextEntry2] : 1. Improved anxiety management. \par 2. Improved mood and self esteem.  [de-identified] : Patient reports mild improvement in mood with ongoing symptoms of anxiety. He states that his ex is not willing to get back together and he is trying to accept this reality. He spoke about feeling loss of the extended family that he had been included with and the contrast to his own family where there was lack of acceptance and connection. Patient stated that he was able to stay occupied by getting together with a couple friends and this helped him cope with sadness. We spoke more about negative self talk he engages in and working on challenging some of these irrational ideas. He was encouraged to us mindfulness exercises and grounding techniques as well as exercise and pursuing hobbies that include other people.

## 2023-05-03 ENCOUNTER — APPOINTMENT (OUTPATIENT)
Dept: OTOLARYNGOLOGY | Facility: CLINIC | Age: 28
End: 2023-05-03
Payer: COMMERCIAL

## 2023-05-03 PROCEDURE — 99442: CPT

## 2023-05-05 ENCOUNTER — APPOINTMENT (OUTPATIENT)
Dept: PSYCHIATRY | Facility: CLINIC | Age: 28
End: 2023-05-05

## 2023-05-09 ENCOUNTER — APPOINTMENT (OUTPATIENT)
Dept: PSYCHIATRY | Facility: CLINIC | Age: 28
End: 2023-05-09

## 2023-05-12 ENCOUNTER — APPOINTMENT (OUTPATIENT)
Dept: PSYCHIATRY | Facility: CLINIC | Age: 28
End: 2023-05-12
Payer: COMMERCIAL

## 2023-05-12 ENCOUNTER — APPOINTMENT (OUTPATIENT)
Dept: OTOLARYNGOLOGY | Facility: CLINIC | Age: 28
End: 2023-05-12

## 2023-05-12 PROCEDURE — 90834 PSYTX W PT 45 MINUTES: CPT

## 2023-05-15 NOTE — PLAN
[Cognitive and/or Behavior Therapy] : Cognitive and/or Behavior Therapy  [[facility]] : [facility]  [Recommended Frequency of Visits: ____] : Recommended frequency of visits: [unfilled] [Return in ____ week(s)] : Return in [unfilled] week(s) [FreeTextEntry2] : 1. Improved anxiety management. \par 2. Improved mood and self esteem.  [de-identified] : Patient reports mood and anxiety level variable, He states that he is grieving for relationship that he lost and is feeling very lonely. He spoke about feeling that he dissociates when interacting with  people sometimes and we discussed what that means. He states that he has been told by people that he is not paying attention and he often has a hard time remembering what was discussed. He states that he has hearing aids and does not always wear them and he keeps his hair long to hide them. We spoke more about his difficulty with self acceptance and started to work on finding things about himself that he does like.                     He was encouraged to keep using mindfulness exercises and grounding techniques as well as exercise and pursuing hobbies that include other people. He has joined [employer] team since last session.

## 2023-05-15 NOTE — PHYSICAL EXAM
[Cooperative] : cooperative [Depressed] : depressed [Anxious] : anxious [Constricted] : constricted [Clear] : clear [Linear/Goal Directed] : linear/goal directed [None] : none [None Reported] : none reported [Average] : average [WNL] : within normal limits

## 2023-05-18 ENCOUNTER — APPOINTMENT (OUTPATIENT)
Dept: PSYCHIATRY | Facility: CLINIC | Age: 28
End: 2023-05-18

## 2023-05-25 ENCOUNTER — APPOINTMENT (OUTPATIENT)
Dept: PSYCHIATRY | Facility: CLINIC | Age: 28
End: 2023-05-25

## 2023-05-29 ENCOUNTER — NON-APPOINTMENT (OUTPATIENT)
Age: 28
End: 2023-05-29

## 2023-06-02 ENCOUNTER — APPOINTMENT (OUTPATIENT)
Dept: PSYCHIATRY | Facility: CLINIC | Age: 28
End: 2023-06-02

## 2023-06-14 ENCOUNTER — APPOINTMENT (OUTPATIENT)
Dept: PSYCHIATRY | Facility: CLINIC | Age: 28
End: 2023-06-14
Payer: COMMERCIAL

## 2023-06-14 DIAGNOSIS — F41.0 PANIC DISORDER [EPISODIC PAROXYSMAL ANXIETY]: ICD-10-CM

## 2023-06-14 PROCEDURE — 99215 OFFICE O/P EST HI 40 MIN: CPT

## 2023-06-14 NOTE — PHYSICAL EXAM
[None] : none [Anxious] : anxious [Normal] : normal [Fair] : fair [FreeTextEntry8] : Subdued [FreeTextEntry9] : Mild dysphoria

## 2023-06-14 NOTE — DISCUSSION/SUMMARY
[FreeTextEntry1] : 27-year-old male with anxiety insomnia with reactive depression.  Plan begin Lexapro 10 mg.  Alternative strategies reviewed.  Follow-up in 4 to 6 weeks.

## 2023-06-14 NOTE — PAST MEDICAL HISTORY
[FreeTextEntry1] : Patient is never seen a psychiatrist nor has he ever been in therapy.  The only medications he has been on have been Paxil 10 mg Lexapro.

## 2023-06-14 NOTE — SOCIAL HISTORY
[FreeTextEntry1] : Patient grew up in [place].  He went to [employer] in high school graduating in 2011.  He has small group of friends it was an average experience he ran track he was a fair student.  He then went to a [employer] graduating in 2015 with a degree in business.  He had a 3.3 grade point average.  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

## 2023-06-14 NOTE — FAMILY HISTORY
[FreeTextEntry1] : Patient born on [place].  Mother 64 currently unemployed history of depression back pain father 62 works in insurance patient is an only child he denied any psychiatric alcohol or drug history in the family.  No abuse history growing up.

## 2023-06-14 NOTE — CURRENT PSYCHIATRIC SYMPTOMS
[Insomnia] : no insomnia disorder [Excessive Worry] : excessive worry [Ruminations] : no rumination disorder [Panic] : panic  [de-identified] : Mild dysphoria [de-identified] : Denied [de-identified] : Denied [de-identified] : None [de-identified] : None [de-identified] : None

## 2023-06-14 NOTE — HISTORY OF PRESENT ILLNESS
[FreeTextEntry1] : Patient not seen for 3 years.  Currently working at [employer].  Just got out of a relationship.  Experiencing a lot of social anxiety panic attacks depression periods of dissociation lack of motivation.  Was prescribed Effexor 37.5 mg but did not take it. 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  Patient currently lives with parents.  Patient states he is here because of a longstanding issue with sleep.  Patient states is been going on for at least 5 years.  There apparently is no precipitating event he was in school at the time.  He is also developed some other issues recently to include ringing in his right ear.  He had been treated with antibiotics along with steroids.  He had some sinus infection also.  His primary care physician tried him on Paxil 10 mg which did not help.  He was also tried on Lexapro.  Patient felt uncomfortable with the medication because he did want his parents to find out.  He describes work is okay he has a difficult time dealing with physicians and nursing staff.  He is getting ready to go back to nursing school at a [employer].  He starting today.  Has some stressors at home.  Some mild depression he describes the anxiety is worrying a lot having tachycardia racing thoughts GI upset and diaphoresis he socializes with friends.  He is currently on a keto diet.  He used to run track in high school he is now trying to get back into exercising.\par \par Patient gets up at about 6:00 in the morning.  Tuesday through Saturday he works 8:00 until 530.  3 nights a week he works from 6:00 to 10 at a [employer].  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  Patient will try to watch television tire himself out.  He has a lot of racing thoughts at night.  Once asleep he will awaken throughout the night.  Appetite normal height 5 feet 9 inches weight 165 pounds.  He has lost about 30 pounds on a diet.  Energy decreased patient not dating.

## 2023-06-15 ENCOUNTER — NON-APPOINTMENT (OUTPATIENT)
Age: 28
End: 2023-06-15

## 2023-07-17 ENCOUNTER — APPOINTMENT (OUTPATIENT)
Dept: PSYCHIATRY | Facility: CLINIC | Age: 28
End: 2023-07-17
Payer: COMMERCIAL

## 2023-07-17 ENCOUNTER — APPOINTMENT (OUTPATIENT)
Dept: PSYCHIATRY | Facility: CLINIC | Age: 28
End: 2023-07-17

## 2023-08-01 ENCOUNTER — APPOINTMENT (OUTPATIENT)
Dept: OTOLARYNGOLOGY | Facility: CLINIC | Age: 28
End: 2023-08-01
Payer: COMMERCIAL

## 2023-08-01 DIAGNOSIS — H69.82 OTHER SPECIFIED DISORDERS OF EUSTACHIAN TUBE, LEFT EAR: ICD-10-CM

## 2023-08-01 DIAGNOSIS — R42 DIZZINESS AND GIDDINESS: ICD-10-CM

## 2023-08-01 PROCEDURE — 92504 EAR MICROSCOPY EXAMINATION: CPT

## 2023-08-01 PROCEDURE — 99214 OFFICE O/P EST MOD 30 MIN: CPT

## 2023-08-01 RX ORDER — METHYLPREDNISOLONE 4 MG/1
4 TABLET ORAL
Qty: 1 | Refills: 0 | Status: COMPLETED | COMMUNITY
Start: 2023-04-03 | End: 2023-08-01

## 2023-08-01 RX ORDER — VENLAFAXINE HYDROCHLORIDE 37.5 MG/1
37.5 CAPSULE, EXTENDED RELEASE ORAL
Qty: 90 | Refills: 0 | Status: COMPLETED | COMMUNITY
Start: 2023-05-03 | End: 2023-08-01

## 2023-08-01 RX ORDER — ESCITALOPRAM OXALATE 10 MG/1
10 TABLET ORAL
Qty: 30 | Refills: 1 | Status: COMPLETED | COMMUNITY
Start: 2023-06-14 | End: 2023-08-01

## 2023-08-01 NOTE — PHYSICAL EXAM
[Binocular Microscopic Exam] : Binocular microscopic exam was performed [Hearing Fernandez Test (Tuning Fork On Forehead)] : no lateralization of tone [Hearing Loss Right Only] : normal [Hearing Loss Left Only] : normal [Rinne Test Air Conduction Persists > Bone Conduction Right] : bone conduction greater than air conduction on the right [Rinne Test Air Conduction Persists > Bone Conduction Left] : bone conduction greater than air conduction on the left [Nystagmus] : ~T no ~M nystagmus was seen [Fukuda Step Test] : Fukuda Step Test was Negative [Romberg's Sign] : Romberg's sign was absent [Fistula Sign] : Fistula Sign: Negative [Past-Pointing] : Past-Pointing: Negative [Shiv-Hallkostaske] : Morrow-Hallpike: Negative [Midline] : trachea located in midline position [Normal] : no rashes

## 2023-08-01 NOTE — DATA REVIEWED
[de-identified] : An audiogram was ordered and performed including pure tones, tympanometry and speech testing for the patient complaint of  i have independently reviewed the patient's audiogram from today and my findings include

## 2023-09-12 ENCOUNTER — APPOINTMENT (OUTPATIENT)
Dept: OTOLARYNGOLOGY | Facility: CLINIC | Age: 28
End: 2023-09-12
Payer: COMMERCIAL

## 2023-09-12 VITALS — HEIGHT: 69 IN | BODY MASS INDEX: 25.18 KG/M2 | WEIGHT: 170 LBS

## 2023-09-12 DIAGNOSIS — H90.3 SENSORINEURAL HEARING LOSS, BILATERAL: ICD-10-CM

## 2023-09-12 PROCEDURE — 92504 EAR MICROSCOPY EXAMINATION: CPT

## 2023-09-12 PROCEDURE — 99213 OFFICE O/P EST LOW 20 MIN: CPT

## 2023-10-04 ENCOUNTER — NON-APPOINTMENT (OUTPATIENT)
Age: 28
End: 2023-10-04

## 2023-11-06 ENCOUNTER — RX RENEWAL (OUTPATIENT)
Age: 28
End: 2023-11-06

## 2023-11-14 ENCOUNTER — NON-APPOINTMENT (OUTPATIENT)
Age: 28
End: 2023-11-14

## 2023-11-17 ENCOUNTER — APPOINTMENT (OUTPATIENT)
Dept: OTOLARYNGOLOGY | Facility: CLINIC | Age: 28
End: 2023-11-17
Payer: COMMERCIAL

## 2023-11-17 VITALS
SYSTOLIC BLOOD PRESSURE: 111 MMHG | WEIGHT: 170 LBS | TEMPERATURE: 98.2 F | BODY MASS INDEX: 25.18 KG/M2 | HEART RATE: 62 BPM | DIASTOLIC BLOOD PRESSURE: 65 MMHG | HEIGHT: 69 IN

## 2023-11-17 DIAGNOSIS — H69.93 UNSPECIFIED EUSTACHIAN TUBE DISORDER, BILATERAL: ICD-10-CM

## 2023-11-17 PROCEDURE — 99213 OFFICE O/P EST LOW 20 MIN: CPT | Mod: 25

## 2023-11-17 PROCEDURE — 92567 TYMPANOMETRY: CPT

## 2023-11-17 PROCEDURE — 92557 COMPREHENSIVE HEARING TEST: CPT

## 2024-01-17 ENCOUNTER — NON-APPOINTMENT (OUTPATIENT)
Age: 29
End: 2024-01-17

## 2024-02-09 ENCOUNTER — RX RENEWAL (OUTPATIENT)
Age: 29
End: 2024-02-09

## 2024-02-18 ENCOUNTER — NON-APPOINTMENT (OUTPATIENT)
Age: 29
End: 2024-02-18

## 2024-02-21 ENCOUNTER — NON-APPOINTMENT (OUTPATIENT)
Age: 29
End: 2024-02-21

## 2024-02-23 ENCOUNTER — NON-APPOINTMENT (OUTPATIENT)
Age: 29
End: 2024-02-23

## 2024-02-26 ENCOUNTER — APPOINTMENT (OUTPATIENT)
Dept: PSYCHIATRY | Facility: CLINIC | Age: 29
End: 2024-02-26

## 2024-02-28 ENCOUNTER — APPOINTMENT (OUTPATIENT)
Dept: OTOLARYNGOLOGY | Facility: CLINIC | Age: 29
End: 2024-02-28

## 2024-03-25 ENCOUNTER — APPOINTMENT (OUTPATIENT)
Dept: OTOLARYNGOLOGY | Facility: CLINIC | Age: 29
End: 2024-03-25
Payer: COMMERCIAL

## 2024-03-25 VITALS
SYSTOLIC BLOOD PRESSURE: 129 MMHG | HEIGHT: 69 IN | DIASTOLIC BLOOD PRESSURE: 71 MMHG | BODY MASS INDEX: 25.18 KG/M2 | WEIGHT: 170 LBS

## 2024-03-25 DIAGNOSIS — R09.81 NASAL CONGESTION: ICD-10-CM

## 2024-03-25 DIAGNOSIS — J32.9 CHRONIC SINUSITIS, UNSPECIFIED: ICD-10-CM

## 2024-03-25 DIAGNOSIS — Z98.890 OTHER SPECIFIED POSTPROCEDURAL STATES: ICD-10-CM

## 2024-03-25 DIAGNOSIS — J34.89 OTHER SPECIFIED DISORDERS OF NOSE AND NASAL SINUSES: ICD-10-CM

## 2024-03-25 DIAGNOSIS — J32.4 CHRONIC PANSINUSITIS: ICD-10-CM

## 2024-03-25 DIAGNOSIS — H93.8X3 OTHER SPECIFIED DISORDERS OF EAR, BILATERAL: ICD-10-CM

## 2024-03-25 DIAGNOSIS — J01.90 ACUTE SINUSITIS, UNSPECIFIED: ICD-10-CM

## 2024-03-25 DIAGNOSIS — Z01.10 ENCOUNTER FOR EXAMINATION OF EARS AND HEARING W/OUT ABNORMAL FINDINGS: ICD-10-CM

## 2024-03-25 DIAGNOSIS — J34.3 HYPERTROPHY OF NASAL TURBINATES: ICD-10-CM

## 2024-03-25 DIAGNOSIS — J30.2 OTHER SEASONAL ALLERGIC RHINITIS: ICD-10-CM

## 2024-03-25 PROCEDURE — 31231 NASAL ENDOSCOPY DX: CPT

## 2024-03-25 PROCEDURE — 99214 OFFICE O/P EST MOD 30 MIN: CPT | Mod: 25

## 2024-03-25 NOTE — REASON FOR VISIT
Assumed care of patient, A&Ox2, confabulating responses, denies any current complaints, daughter at bedside    [Subsequent Evaluation] : a subsequent evaluation for

## 2024-04-08 ENCOUNTER — APPOINTMENT (OUTPATIENT)
Dept: NEUROLOGY | Facility: CLINIC | Age: 29
End: 2024-04-08

## 2024-04-08 NOTE — ED PROVIDER NOTE - IV ALTEPLASE EXCL ABS HIDDEN
Introduction Text (Please End With A Colon): The following procedure was deferred: Size Of Lesion In Cm (Optional): 0 Procedure To Be Performed At Next Visit: Excision by punch method Detail Level: Detailed Procedure To Be Performed At Next Visit: Excision show

## 2024-04-16 ENCOUNTER — APPOINTMENT (OUTPATIENT)
Dept: TRANSGENDER CARE | Facility: CLINIC | Age: 29
End: 2024-04-16

## 2024-04-23 ENCOUNTER — APPOINTMENT (OUTPATIENT)
Dept: PSYCHIATRY | Facility: CLINIC | Age: 29
End: 2024-04-23
Payer: COMMERCIAL

## 2024-04-23 DIAGNOSIS — F32.A DEPRESSION, UNSPECIFIED: ICD-10-CM

## 2024-04-23 PROCEDURE — G2211 COMPLEX E/M VISIT ADD ON: CPT

## 2024-04-23 PROCEDURE — 99215 OFFICE O/P EST HI 40 MIN: CPT

## 2024-04-25 ENCOUNTER — APPOINTMENT (OUTPATIENT)
Dept: TRANSGENDER CARE | Facility: CLINIC | Age: 29
End: 2024-04-25
Payer: COMMERCIAL

## 2024-04-25 VITALS
SYSTOLIC BLOOD PRESSURE: 128 MMHG | DIASTOLIC BLOOD PRESSURE: 86 MMHG | WEIGHT: 172 LBS | HEART RATE: 74 BPM | TEMPERATURE: 98.2 F | HEIGHT: 69 IN | OXYGEN SATURATION: 98 % | BODY MASS INDEX: 25.48 KG/M2

## 2024-04-25 DIAGNOSIS — Z76.89 PERSONS ENCOUNTERING HEALTH SERVICES IN OTHER SPECIFIED CIRCUMSTANCES: ICD-10-CM

## 2024-04-25 DIAGNOSIS — Z11.3 ENCOUNTER FOR SCREENING FOR INFECTIONS WITH A PREDOMINANTLY SEXUAL MODE OF TRANSMISSION: ICD-10-CM

## 2024-04-25 DIAGNOSIS — F41.1 GENERALIZED ANXIETY DISORDER: ICD-10-CM

## 2024-04-25 LAB
BASOPHILS # BLD AUTO: 0.02 K/UL
BASOPHILS NFR BLD AUTO: 0.2 %
EOSINOPHIL # BLD AUTO: 0.04 K/UL
EOSINOPHIL NFR BLD AUTO: 0.4 %
HCT VFR BLD CALC: 49.9 %
HGB BLD-MCNC: 16.5 G/DL
IMM GRANULOCYTES NFR BLD AUTO: 0.3 %
LYMPHOCYTES # BLD AUTO: 1.74 K/UL
LYMPHOCYTES NFR BLD AUTO: 19.2 %
MAN DIFF?: NORMAL
MCHC RBC-ENTMCNC: 29.8 PG
MCHC RBC-ENTMCNC: 33.1 GM/DL
MCV RBC AUTO: 90.1 FL
MONOCYTES # BLD AUTO: 0.59 K/UL
MONOCYTES NFR BLD AUTO: 6.5 %
NEUTROPHILS # BLD AUTO: 6.62 K/UL
NEUTROPHILS NFR BLD AUTO: 73.4 %
PLATELET # BLD AUTO: 300 K/UL
RBC # BLD: 5.54 M/UL
RBC # FLD: 12.5 %
WBC # FLD AUTO: 9.04 K/UL

## 2024-04-25 PROCEDURE — 99205 OFFICE O/P NEW HI 60 MIN: CPT

## 2024-04-25 PROCEDURE — 36415 COLL VENOUS BLD VENIPUNCTURE: CPT

## 2024-04-25 RX ORDER — CYCLOBENZAPRINE HYDROCHLORIDE 5 MG/1
5 TABLET, FILM COATED ORAL
Qty: 30 | Refills: 2 | Status: DISCONTINUED | COMMUNITY
Start: 2023-09-12 | End: 2024-04-25

## 2024-04-25 RX ORDER — AZITHROMYCIN 250 MG/1
250 TABLET, FILM COATED ORAL
Qty: 1 | Refills: 1 | Status: DISCONTINUED | COMMUNITY
Start: 2023-11-19 | End: 2024-04-25

## 2024-04-25 RX ORDER — METHYLPREDNISOLONE 4 MG/1
4 TABLET ORAL
Qty: 1 | Refills: 0 | Status: DISCONTINUED | COMMUNITY
Start: 2023-08-09 | End: 2024-04-25

## 2024-04-25 RX ORDER — CLONAZEPAM 0.5 MG/1
0.5 TABLET ORAL
Qty: 40 | Refills: 0 | Status: DISCONTINUED | COMMUNITY
Start: 2023-09-12 | End: 2024-04-25

## 2024-04-25 RX ORDER — METHYLPREDNISOLONE 4 MG/1
4 TABLET ORAL
Qty: 1 | Refills: 0 | Status: DISCONTINUED | COMMUNITY
Start: 2024-02-21 | End: 2024-04-25

## 2024-04-25 RX ORDER — AMOXICILLIN AND CLAVULANATE POTASSIUM 875; 125 MG/1; MG/1
875-125 TABLET, COATED ORAL
Qty: 28 | Refills: 0 | Status: DISCONTINUED | COMMUNITY
Start: 2024-02-21 | End: 2024-04-25

## 2024-04-25 NOTE — HISTORY OF PRESENT ILLNESS
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 Lives in an apartment with a roommate. Single. Identifies as a very private person.   PMHx: Sees ENT for chronic sinusitis and high frequency hearing loss, ear fullness.  Meds: ENT rx'ed Effexor for ear symptoms, started 1.5 months ago  Substance hx: Feels he has been drinking a bit more recently, a few times a week, same with marijuana - edibles, vape pen, or flower. Denies nicotine use in any form. Denies any other substance use.  Sexual health: Identifies as straight. Has been active recently. Has never had STI testing, amenable today.   Current concerns: -- Struggles with focus and hyperfixation. Has been lifelong but never had ADHD evaluation. Interested in psych/neuropsych referral. Has a hard time focusing on books, movies, starting and finishing tasks.   PCP: -- Will try to find his childhood vax records, unsure if he received HPV

## 2024-04-25 NOTE — ASSESSMENT
[FreeTextEntry1] : #Establish PCP -- Baseline screening labs today. Patient endorses fasting -- Obtain vaccine records #Sexual health -- Routine STI screening today #Anxiety, OCD/ADHD traits Patient endorses many years of difficulty with attention/focus, starting/completing tasks etc, while simultaneously endorsing h/o hyperfixation and rumination on various topics. He has requested that some of his concerns remain omitted from the medical record; however would appreciate referral to discuss these concerns more extensively and this provider will inform mental health team separately of what was discussed. He has engaged in care with a behavioral health program and will f/u with them re: OCD, ADHD evaluation. If unable to obtain adequate eval/medication trial with other provider will RTC to discuss further with PCP

## 2024-04-29 ENCOUNTER — APPOINTMENT (OUTPATIENT)
Dept: PHARMACY | Facility: CLINIC | Age: 29
End: 2024-04-29
Payer: SELF-PAY

## 2024-04-29 LAB
ALBUMIN SERPL ELPH-MCNC: 5.1 G/DL
ALP BLD-CCNC: 87 U/L
ALT SERPL-CCNC: 16 U/L
ANION GAP SERPL CALC-SCNC: 12 MMOL/L
AST SERPL-CCNC: 18 U/L
BILIRUB SERPL-MCNC: 0.5 MG/DL
BUN SERPL-MCNC: 15 MG/DL
C TRACH RRNA SPEC QL NAA+PROBE: NOT DETECTED
CALCIUM SERPL-MCNC: 10.2 MG/DL
CHLORIDE SERPL-SCNC: 104 MMOL/L
CHOLEST SERPL-MCNC: 159 MG/DL
CO2 SERPL-SCNC: 27 MMOL/L
CREAT SERPL-MCNC: 0.9 MG/DL
EGFR: 118 ML/MIN/1.73M2
ESTIMATED AVERAGE GLUCOSE: 100 MG/DL
GLUCOSE SERPL-MCNC: 89 MG/DL
HBA1C MFR BLD HPLC: 5.1 %
HCV AB SER QL: NONREACTIVE
HCV S/CO RATIO: 0.12 S/CO
HDLC SERPL-MCNC: 62 MG/DL
HIV1+2 AB SPEC QL IA.RAPID: NONREACTIVE
LDLC SERPL CALC-MCNC: 76 MG/DL
N GONORRHOEA RRNA SPEC QL NAA+PROBE: NOT DETECTED
NONHDLC SERPL-MCNC: 97 MG/DL
POTASSIUM SERPL-SCNC: 4.4 MMOL/L
PROT SERPL-MCNC: 7.7 G/DL
SODIUM SERPL-SCNC: 143 MMOL/L
SOURCE AMPLIFICATION: NORMAL
SOURCE ANAL: NORMAL
SOURCE ORAL: NORMAL
T PALLIDUM AB SER QL IA: NEGATIVE
TRIGL SERPL-MCNC: 120 MG/DL

## 2024-04-29 PROCEDURE — V5010 ASSESSMENT FOR HEARING AID: CPT | Mod: NC

## 2024-04-29 NOTE — ASSESSMENT
[FreeTextEntry1] : Counseling provided re: audiogram.  Discussed different hearing aid styles, manufacturers, levels of technology and various features including direct streaming and rechargeable options. Reviewed pros and cons of ITC vs. PAUL style. Patient reports hearing aid evaluation approx. 1 year ago at outside facility- did not like Oticon Real 1 due to cosmetics. Interested in small PAUL. Discussed Widex Passion, however does not have streaming or rechargeable capabilities- patient prefers those features. Showed differences in size of Real 1 vs. Intent 1- new PAUL is a bit smaller.  Performed DEMO of Intent 1 MR-R with 8mm open domes. Set to ACC 3 with programmed DV domes to provide some LF gain. Patient noted improvements inside quiet office and outside with minor traffic background noise.  Counseled re: realistic expectations with hearing aids and acclimatization period.   Based on patient's lifestyle, they selected Oticon Intent 2 MR-R in light brown color. Ordered with 1(85) receivers and 8mm/6mm open domes AU.   Informed of warranties, 45-day trial period, and patient financial responsibility.

## 2024-04-29 NOTE — HISTORY OF PRESENT ILLNESS
[Hearing Aid ___] : no hearing aid(s) [Difficulty hearing in group setting] : difficulty hearing in group setting [FreeTextEntry1] : 27 yo male referred by ENT for hearing aid evaluation- medical clearance provided. Patient presents with mild to moderate SNHL with good/excellent speech recognition, bilaterally. Patient reports hearing loss since birth. Family hx of hearing loss- maternal side. Patient reports he refused to wear hearing aids when he was a child. Now with increased difficulties including mishearing individual speakers, groups, background noise and television.

## 2024-05-01 ENCOUNTER — NON-APPOINTMENT (OUTPATIENT)
Age: 29
End: 2024-05-01

## 2024-05-01 ENCOUNTER — APPOINTMENT (OUTPATIENT)
Dept: PSYCHIATRY | Facility: CLINIC | Age: 29
End: 2024-05-01

## 2024-05-02 NOTE — DISCUSSION/SUMMARY
[FreeTextEntry1] : The patient is a 28-year-old man with onset of symptoms of anxiety since childhood reports symptoms consistent with generalized anxiety disorder and social anxiety disorder.  He was previously prescribed medications but never took any of the meds send states recently was prescribed venlafaxine by his ENT physician for ear fullness which he started taking about a month ago.  Patient reported no adverse effects to the medication but does not remember to take it regularly.  Patient also complains attention problems however considering long history of severe anxiety that has not been treated and not controlled attention problems could likely be a symptom of severe anxiety.  Informed patient that we should first address the anxiety symptoms and we could continue with the venlafaxine extended release and increase the dose for increased therapeutic benefit.

## 2024-05-02 NOTE — PHYSICAL EXAM
[None] : none [Average] : average [Cooperative] : cooperative [Depressed] : depressed [Anxious] : anxious [Constricted] : constricted [Clear] : clear [Linear/Goal Directed] : linear/goal directed [None Reported] : none reported [WNL] : within normal limits [FreeTextEntry7] : No SI/HI

## 2024-05-02 NOTE — HISTORY OF PRESENT ILLNESS
[FreeTextEntry1] : The patient was initially seen at this practice for psychiatric evaluation on 8/31/2020 by Dr Frederick, then again on 12/30/2020 and the last visit was on 6/14/2023. Patient is transferring care to this provider as the previous provider will be leaving the practice on 6/30/2024. He was diagnosed with depression, chronic insomnia, social anxiety disorder and was last prescribed Lexapro 10 mg/day.  Today is first evaluation of this patient by this provider. Below information is gathered from the patient and review of previous medical records.  During this appointment there was not enough time to review in detail patient's family history and social history which will be obtained on the next visit.  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  Patient reported that he even though he was prescribed various psychotropic medications in the past couple of years since his first psychiatric evaluation with Dr. Frederick in August 202020 he has not taken any of those prescribed medications.  Patient states that about a month ago his ENT physician prescribed Effexor extended release 37.5 mg because of "fullness in the ear".  Patient states that he is taking this medication but forgets to take it 2-3 times a week.  Patient also reported that he started therapy last year but felt too embarrassed to continue treatment. Patient states he remembers he was between 4 to 8 years of age when he first started experiencing anxiety.  Patient describes his anxiety is feeling tense, cannot relax, pace a lot, have intrusive repetitive thoughts which when he elaborates are more of ruminations and excessive worrying and sometimes "dissociation".  Patient reported he has trouble falling asleep as he cannot relax and has excessive worry thoughts running through his head goes to bed around 12 but he falls asleep between 2 and 3 AM and wakes up in the morning at 6:30 AM.  Patient states he has tried taking melatonin 10 mg sometimes but does not help.  Patient reports he sleeps on average about 5 hours per night.  Patient also reports severe anxiety in social settings, fears people judging him scrutinizing him including when he is on the phone he feels very self-conscious and extremely anxious.  Patient reported also experiencing anxiety attacks with shortness of breath and increased heart rate generally this happens when he is alone by himself and not in social settings as he feels that he just gets through the situation and then has anxiety attack as a release.  Patient also reported that he always has felt depressed and isolated.  Patient also reported chronic suicidal ideation but no plan or intent states that he would never act on these thoughts because he cares about people in his life, his family and friends. He denied history of aggression towards self or objects or others and denied access to firearms. Patient also reported some compulsions with his hair wherein he needs it to be perfect has to wash 2-3 times until he feels it is washed properly.  Patient reported no intrusive unwanted worries or fears that he feels are irrational recently.  Patient reports he also has trouble focusing and remembering things states that he cannot pay attention while reading books or in conversations. Patient denied any symptoms suggestive of brent or hypomania or psychosis in the recent or remote past.  [FreeTextEntry2] : PAST PSYCHIATRIC HISTORY:  Onset: of anxiety between 6-10 years of age.  Psych eval: first in 08/30/2020. Psychotherapy: yes, a few sessions with therapist in our practice in 2023 Hospitalizations: denied  Previous suicide attempts: denied   PAST SUBSTANCE ABUSE HISTORY:  Nicotine: denied Alcohol: "sometimes", in the past 2 years, every night- 2 drinks CAGE questionnaire: Negative Blackouts: denied   Cannabis: First tried it in college states that it increased his anxiety.  Patient reported that for about 2 years he was smoking on Friday to Sunday.   Other Illicit drugs: denied Medical/social/legal problems related to substance use: denied Rehab tx: denied   [FreeTextEntry3] : Before 2020: Paxil 10 mg/day, Lexapro, both by PCP-states he did not take those medications from 2020- was rxed but did not take Celexa, gabapentin, venlafaxine, Klonopin, venlafaxine-started a month ago.

## 2024-05-02 NOTE — PLAN
[FreeTextEntry5] : Discussed with patient his diagnosis, treatment, alternatives to recommended treatment, risk Vs benefits of treatment and no treatment and alternative treatments.  Lab/other tests: reviewed. Medication: Increase venlafaxine extended release dose to 75 mg daily. Side effects including but not limited to GI side effects, prone to withdrawal symptoms if not consistent with taking the medications and possibility of elevated blood pressure at higher doses and black box warning of SI in patients younger than 24 were discussed Educated patient of importance of remaining abstinent from drugs and alcohol.  Emergency procedures were discussed: pt. educated to call 911 or go to nearest ER for worsening of symptoms/suicidal/homicidal ideation.  Patient requested and preferred to begin individual psychotherapy as soon as possible RTC in 3-4 weeks or earlier as needed  Patient given opportunity to ask questions and their questions were answered and they expressed understanding and agreement with above plan.   I stop checked today: Reference #: [id number]  Practitioner Count: 0 Pharmacy Count: 0 Current Opioid Prescriptions: 0 Current Benzodiazepine Prescriptions: 0 Current Stimulant Prescriptions: 0   Patient Demographic Information (PDI)     PDI	First Name	Last Name	Birth Date	Gender	Street Address	City	State	Zip [place]	1995	Male	[address]	[address]  Prescription Information    PDI Filter:   PDI	My Rx	Current Rx	Drug Type	Rx Written	Rx Dispensed	Drug	Quantity	Days Supply	Prescriber Name	Prescriber TAMIA #	Payment Method	Dispenser A	N	N	B	09/12/2023	09/14/2023	clonazepam 0.5 mg tablet	30	30	Mike Cruz)	[id number]	Insurance	[facility] [id number]  I spent a total of 45 minutes for today's visit in evaluating and treating the patient as per above, including: preparing for patient's appointment (review of prior documents, [facility] ), performing a medically necessary exam/evaluation, communicating/counseling/educating the patient ordering medications, documenting clinical information in the EHR.

## 2024-05-02 NOTE — PAST MEDICAL HISTORY
[FreeTextEntry1] : Past medical history:  Major medical problem- none except ear pressure OTC medications: denies  TBI: denies Seizures: denies  Migraine HA: denies   Developmental History:  Pre/madison-elaina problems: Unremarkable per patient knowledge Developmental milestones: unremarkable Infections: none per patient knowledge Febrile seizures: none per patient knowledge

## 2024-05-08 ENCOUNTER — APPOINTMENT (OUTPATIENT)
Dept: PSYCHIATRY | Facility: CLINIC | Age: 29
End: 2024-05-08
Payer: COMMERCIAL

## 2024-05-08 PROCEDURE — 90834 PSYTX W PT 45 MINUTES: CPT | Mod: 95

## 2024-05-10 NOTE — END OF VISIT
[Duration of Psychotherapy Visit (minutes spent in synchronous communication): ____] : Duration of Psychotherapy Visit (minutes spent in synchronous communication): [unfilled] [Individual Psychotherapy for 38-52 minutes] : Individual Psychotherapy for 38 - 52 minutes [Teletherapy Service Provided] : The services provided in this session were delivered via tele-therapy

## 2024-05-10 NOTE — PLAN
[FreeTextEntry2] : 1. Improved anxiety management. \par  2. Improved mood and self esteem.  [Cognitive and/or Behavior Therapy] : Cognitive and/or Behavior Therapy  [[facility]] : [facility]  [de-identified] : Patient presented as oriented times three and with stable mood. No SI or HI. Client shared desire to return to therapy and feeling more ready and searching for a good fit in his therapeutic relationship, client reported after visit feeling comfortable with this therapist. Clinician will continue to check in with client to ensure he feels safe and comfortable.  Client shared various goals he wants work on with communication and doubts within self of trusting self and others. Supportive therapy provided here with validation and emotional support. As therapy report builds a more formal tx plan to be created and therapy work to begin. Some psychoeducation provided.  [Recommended Frequency of Visits: ____] : Recommended frequency of visits: [unfilled] [Return in ____ week(s)] : Return in [unfilled] week(s) [FreeTextEntry1] : Client ot attend weekly sessions to focus on tx goals and develop healthier sense of self in desired areas of focus.

## 2024-05-15 ENCOUNTER — APPOINTMENT (OUTPATIENT)
Dept: PSYCHIATRY | Facility: CLINIC | Age: 29
End: 2024-05-15
Payer: COMMERCIAL

## 2024-05-15 PROCEDURE — 90832 PSYTX W PT 30 MINUTES: CPT | Mod: 95

## 2024-05-15 NOTE — PLAN
[FreeTextEntry2] : 1. Improved anxiety management. \par  2. Improved mood and self esteem.  [Cognitive and/or Behavior Therapy] : Cognitive and/or Behavior Therapy  [[place] [facility]] : [facility]  [de-identified] : Patient presented as oriented times three and with stable mood. No SI or HI. Session focus on anxiety and communication skills. Psychoeducation provided on healthy communication skills and best ways to assert needs and practice healthy. Reframing with anxiety and integration of positive self talk to boost self-confidence. Gestalt Prayer reviewed, further work to continue via CBT work and challenging negative thoughts. Session was virtual for today, and had to be switched to phone due to poor service. Client encouraged to find better spot to do therapy not a hallway, but car, or office, or home etc.  [Recommended Frequency of Visits: ____] : Recommended frequency of visits: [unfilled] [Return in ____ week(s)] : Return in [unfilled] week(s) [FreeTextEntry1] : Client ot attend weekly sessions to focus on tx goals and develop healthier sense of self in desired areas of focus.

## 2024-05-15 NOTE — END OF VISIT
Your Child's Health  Nine-Month-Old Visit    Isabella Yang MD      Dave Alcaraz  January 28, 2022    Visit Vitals  Ht 28.5\" (72.4 cm)   Wt 9.26 kg   HC 44 cm (17.32\")   BMI 17.67 kg/m²     Weight: 20.41 lbs    NUTRITION:  Phase out the bottle. Children should get off the bottle as soon as possible after reaching one year of age. Bottle use or nursing past one year contributes to higher rates of tooth decay. If you have a hard time stopping the bottle, you can either fill the bottle with water (which will not harm the teeth) or remove the bottle as soon as it is empty. Encourage the use of a cup.  Introduce finger foods: Table food can comprise a full diet as long as it is of a type that the baby can manage without choking. For that reason, avoid nuts, popcorn, raw vegetables, and foods like grapes and large hot dog pieces that, if swallowed whole, could block the airway.    Because we have less sunlight in our part of the country, infants whose only source of milk is mother's milk should have nursing baby vitamins (available without prescription at the drug store)each day.  Formula fed babies should also take vitamins until they are drinking 32 ounces of formula a day.      We no longer consider juice a health food.  When fruits were not available, it was a nutritional help, but whole fruits are much better nutritionally than juice. Amounts of juice over 4 ounces per day are associated with an increased risk of obesity.    Avoid using food, especially sweets, to keep Dave from crying.     DEVELOPMENT/BEHAVIOR:  Most babies are becoming more mobile at 9 months.  They may roll, scoot, or crawl.  Some will try to pull up to a standing position. Single consonant sounds (such as \"da\" or \"ba\") will lead to repetitive consonants (\"baba\" or \"mary lou\").  You can encourage language development by pointing to objects, pictures, and body parts and saying the name. At this age babies grab everything and it all goes  [Duration of Psychotherapy Visit (minutes spent in synchronous communication): ____] : Duration of Psychotherapy Visit (minutes spent in synchronous communication): [unfilled] [Individual Psychotherapy for 16-37 minutes] : Individual Psychotherapy for 16-37 minutes into the mouth. These movements become smoother and faster, so be careful. Show your approval for desirable actions with smiles and kind words. Begin to use \"no\" and to look away for a few seconds (a beginner's time-out) when undesirable behavior occurs. Most babies at this age can sleep all night, often ten to twelve hours. Most babies take at least one nap a day, but a few do not nap at all.    ACCIDENT PREVENTION:  Kitchen: Hot liquids, hot foods,  and electrical cords must be kept out of reach. Move cleaning products up from under sinks.  Use outlet protectors and hide extension cords.  Stairs: Use francisco or block stairs off with furniture.  Windows: Use locks or guards, especially on upstairs windows. Babies have fallen through screens by leaning on them.  Water Safety: Empty any buckets of water. Children have drowned in buckets, especially the five-gallon construction-material type. Fence off permanent pools and drain wading pools when not in use. Never leave  Spink alone in the tub. Even babies who have had swimming classes are not safe alone in the tub. Keep the toilet lid down.  Medications: If prescribed medication needs to be refrigerated, please store it  at the back of the refrigerator to prevent Spink from reaching it or spilling it.  Poisoning:  Remove the following items from reach or place them in a locked cabinet: Cleaning products, Kerosene (lamp oil), Paint, Pesticides, Purses (which sometimes contain medicines), Plants, Medicines including vitamins  and birth control pills  · Use safety caps on medicines. Household  and polishes must be kept out of reach. Store medicines and  out of sight.  Don't transfer medicines to non-child-proofed or unlabeled containers. Dispose of unused medications. Be especially careful when visiting older persons or families without children in the house.  They may be in the habit of keeping their medicines out on tables.  · Syrup of Ipecac is no  [Teletherapy Service Provided] : The services provided in this session were delivered via tele-therapy longer recommended to be kept in the home. Please dispose of any remaining supplies.  · Call the Poison Center at 1-647.497.2991 for any known or suspected poisoning.    CAR SAFETY:  An approved car seat in the back seat of the car is required by [place] law until Dave is four years old and weighs at least 40 pounds. All infants and toddlers should ride in a rear facing car seat until 2 years of age or until they reach the highest weight or height allowed by their car seats  per recommendation of the American Academy of Pediatrics. When you buy a car seat, please check for safety problems for your particular model by calling the [employer] Safety Hotline at 1-199.830.6298.  To find a certified car seat  who can determine if your car seat is installed properly, call 4-979-SEAT CHECK    SMOKING:  Children exposed to tobacco smoke have more ear infections and pneumonia.  Cold symptoms last longer.  If you smoke, please quit.  If you cannot quit, smoke outside.  Do not smoke near Dave, and do not let others smoke near him.    SHOES: Nicoles feet will develop fine whether he/she has shoes or not.  He can run barefoot indoors or outdoors, as long as the surface is smooth and not hot.  Shoes provide safety, warmth, protection and, of course, decoration.  Choose shoes that have a roomy fit and flat, flexible soles with nonskid bottoms.    SUN EXPOSURE:  If you plan to have Dave outside for more than 30 minutes, please follow the guidelines in our Sun Exposure handout.    TEETHING:  Teething children may have no symptoms at all, or they may experience drooling, rashes, crabbiness, or changes in diet. Do not assume that a fever is due to teething. Temperatures over 101 degrees are usually from some other cause. Once the teeth erupt, you should begin cleaning Nicoles teeth with a washcloth, gauze, or soft toothbrush. Toothpaste is not necessary yet.    LEAD EXPOSURE:  Dave will be more mobile  in the next few months. If there is lead in the house, he may be vulnerable. Let us know if any of the following apply:  · Your home was built before 1960 and has peeling or chipping or chalking paint. Homes built in older cities at the turn of the last century may have lead pipes. Check to see if any of the above applies to Dave's sitter's home, , , or any other house where he spends time.  · You have another child or housemate who is being followed or treated for an elevated lead level.  · Dave lives with an adult whose job or hobby involves lead exposure  (soldering, battery manufacture, recycling, casting, stained glass, etc.).  · You live near an active lead smelter, a battery recycling plant, or a plant that processes hot metal.    TUBERCULOSIS:  There is such a low rate of tuberculosis in our area that frequent skin tests are no longer recommended. However, certain situations do increase Dave's risk, including contact with AIDS patients, nursing home residents, prisoners, immigrants, or homeless persons. Please let us know if Dave has contacts with such persons, or if he has contact with anyone known to have or suspected of having tuberculosis.    MEDICATION FOR FEVER OR PAIN:   Acetaminophen liquid (e.g., Tylenol or Tempra) may be given every four hours as needed for pain or fever.  Acetaminophen liquid is less concentrated than the infant dropper bottle type. Be sure to check which product CONCENTRATION you are using.    INFANT Tylenol/Acetaminophen  Drops (160 MG/5 mL)    Child’s Weight: Dose:  12 - 17 pounds:   80 mg (2.5 mL  (1/2 Teaspoon)  18 - 23 pounds:   120 mg (3.75 mL (3/4 Teaspoon))    CHILDREN’S Tylenol/Acetaminophen  (160 MG/5 mL)    Child’s Weight: Dose:  12 - 17 pounds:   80 mg (2.5 mL  (1/2 Teaspoon)  18 - 23 pounds:   120 mg (3.75 mL (3/4 Teaspoon))      CHILDREN'S  Ibuprofen (e.g., Advil or Motrin) may be given every six hours as needed for pain or  fever.    Child’s Weight: Dose:  13 - 17 pounds:   1/4 -1/2 Teaspoon  18 - 23 pounds:   1/2 -1 Teaspoon         Most Recent Immunizations   Administered Date(s) Administered   • DTaP/HIB/IPV 2019   • Hep B, adolescent or pediatric 2019   • Influenza, injectable, quadrivalent, preservative-free 2019   • Pneumococcal Conjugate 13 valent 2019   • Rotavirus - pentavalent 2019       If Dave develops any of the following reactions within 72 hours after an immunization, notify your pediatrician by calling the pediatric phone nurse:  · A temperature of 105 degrees or above.  · More than 3 hours of continuous crying.  · A shrill, high-pitched cry.  · A pale, limp spell.  · A seizure or fainting spell. In this case, you should call 911 or go immediately to the emergency room.    NEXT VISIT: ONE YEAR OF AGE    NOTE:  Dave should have the One-Year-Old Exam after his first birthday.  The immunizations given at that visit must take place after Dave’s birthday in order to meet [facility] requirements.    Thank you for entrusting your care to [facility].

## 2024-05-20 ENCOUNTER — APPOINTMENT (OUTPATIENT)
Dept: PSYCHIATRY | Facility: CLINIC | Age: 29
End: 2024-05-20
Payer: COMMERCIAL

## 2024-05-20 ENCOUNTER — NON-APPOINTMENT (OUTPATIENT)
Age: 29
End: 2024-05-20

## 2024-05-20 PROCEDURE — 90834 PSYTX W PT 45 MINUTES: CPT | Mod: 95

## 2024-05-21 NOTE — PLAN
[FreeTextEntry2] : 1. Improved anxiety management. \par  2. Improved mood and self esteem.  [Cognitive and/or Behavior Therapy] : Cognitive and/or Behavior Therapy  [[facility]] : [facility]  [de-identified] : Patient presented as oriented times three and with stable mood. No SI or HI. Mindfulness work done today to help client better regulate and teach healthier coping skills. Client responded well to same. Patient was able to integrate some positive self-talk vs negative self talk he is struggling with. Further work on building positive reality based messaging to continue. Session was virtual for today. [Recommended Frequency of Visits: ____] : Recommended frequency of visits: [unfilled] [Return in ____ week(s)] : Return in [unfilled] week(s) [FreeTextEntry1] : Client ot attend weekly sessions to focus on tx goals and develop healthier sense of self in desired areas of focus.

## 2024-05-24 ENCOUNTER — APPOINTMENT (OUTPATIENT)
Dept: PSYCHIATRY | Facility: CLINIC | Age: 29
End: 2024-05-24

## 2024-05-24 NOTE — DISCUSSION/SUMMARY
[FreeTextEntry1] : The patient is a 28-year-old man with onset of symptoms of anxiety since childhood reports symptoms consistent with generalized anxiety disorder and social anxiety disorder.  He was previously prescribed medications but never took any of the meds send states recently was prescribed venlafaxine by his ENT physician for ear fullness which he started taking about a month ago.  Patient reported no adverse effects to the medication but does not remember to take it regularly.  Patient also complains attention problems however considering long history of severe anxiety that has not been treated and not controlled attention problems could likely be a symptom of severe anxiety.  Informed patient that we should first address the anxiety symptoms and we could continue with the venlafaxine extended release and increase the dose for increased therapeutic benefit.  5/24/2024:

## 2024-05-24 NOTE — HISTORY OF PRESENT ILLNESS
[FreeTextEntry1] : Med changes made on last visit: increase venlafaxine XR to 75 mg/day  Patient states since last visit New stressors:  On going stressors:  Mood:   Anxiety: Sleep:  Appetite:   Energy: Motivation: Psychotic Symptoms:   Suicidal/Homicidal/Violent thoughts/intent/plan:           SIB: Side effects from meds: Adherence to med regimen: Good/Fair/Poor/Improved ETOH: Denied excess Cannabis use/abuse: Illicit drug use/abuse: Medical update since last visit: No new medical issues, no new medication Changes in psychosocial history since last visit:           History of present illness at the time of transfer of care on 4/23/2024:  The patient was initially seen at this practice for psychiatric evaluation on 8/31/2020 by Dr Frederick, then again on 12/30/2020 and the last visit was on 6/14/2023. Patient is transferring care to this provider as the previous provider will be leaving the practice on 6/30/2024. He was diagnosed with depression, chronic insomnia, social anxiety disorder and was last prescribed Lexapro 10 mg/day.  Today is first evaluation of this patient by this provider. Below information is gathered from the patient and review of previous medical records.  During this appointment there was not enough time to review in detail patient's family history and social history which will be obtained on the next visit.  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  Patient reported that he even though he was prescribed various psychotropic medications in the past couple of years since his first psychiatric evaluation with Dr. Frederick in August 202020 he has not taken any of those prescribed medications.  Patient states that about a month ago his ENT physician prescribed Effexor extended release 37.5 mg because of "fullness in the ear".  Patient states that he is taking this medication but forgets to take it 2-3 times a week.  Patient also reported that he started therapy last year but felt too embarrassed to continue treatment. Patient states he remembers he was between 4 to 8 years of age when he first started experiencing anxiety.  Patient describes his anxiety is feeling tense, cannot relax, pace a lot, have intrusive repetitive thoughts which when he elaborates are more of ruminations and excessive worrying and sometimes "dissociation".  Patient reported he has trouble falling asleep as he cannot relax and has excessive worry thoughts running through his head goes to bed around 12 but he falls asleep between 2 and 3 AM and wakes up in the morning at 6:30 AM.  Patient states he has tried taking melatonin 10 mg sometimes but does not help.  Patient reports he sleeps on average about 5 hours per night.  Patient also reports severe anxiety in social settings, fears people judging him scrutinizing him including when he is on the phone he feels very self-conscious and extremely anxious.  Patient reported also experiencing anxiety attacks with shortness of breath and increased heart rate generally this happens when he is alone by himself and not in social settings as he feels that he just gets through the situation and then has anxiety attack as a release.  Patient also reported that he always has felt depressed and isolated.  Patient also reported chronic suicidal ideation but no plan or intent states that he would never act on these thoughts because he cares about people in his life, his family and friends. He denied history of aggression towards self or objects or others and denied access to firearms. Patient also reported some compulsions with his hair wherein he needs it to be perfect has to wash 2-3 times until he feels it is washed properly.  Patient reported no intrusive unwanted worries or fears that he feels are irrational recently.  Patient reports he also has trouble focusing and remembering things states that he cannot pay attention while reading books or in conversations. Patient denied any symptoms suggestive of brent or hypomania or psychosis in the recent or remote past.  [FreeTextEntry2] : PAST PSYCHIATRIC HISTORY:  Onset: of anxiety between 6-8 years of age.  Psych eval: first in 08/30/2020. Psychotherapy: yes, a few sessions with therapist in our practice in 2023 Hospitalizations: denied  Previous suicide attempts: denied   PAST SUBSTANCE ABUSE HISTORY:  Nicotine: denied Alcohol: "sometimes", in the past 2 years, every night- 2 drinks CAGE questionnaire: Negative Blackouts: denied   Cannabis: First tried it in college states that it increased his anxiety.  Patient reported that for about 2 years he was smoking on Friday to Sunday.   Other Illicit drugs: denied Medical/social/legal problems related to substance use: denied Rehab tx: denied   [FreeTextEntry3] : Before 2020: Paxil 10 mg/day, Lexapro, both by PCP-states he did not take those medications from 2020- was rxed but did not take Celexa, gabapentin, venlafaxine, Klonopin, venlafaxine-started a month ago.

## 2024-05-24 NOTE — SOCIAL HISTORY
[FreeTextEntry1] : obtained today (5/24/2024) Born and grew up in Parents:  Mother:  Father: 	 Childhood: Relationship with Parents:  Siblings:	 Relationship with siblings: Education: Private/public school Average grades: Learning or behavior problems:  Graduated HS  College: Work hx:  Romantic relationships: Children: Trauma: Legal hx: Patient denies recent or remote hx of legal problems.  Access to firearms: patient denies.

## 2024-05-24 NOTE — PLAN
[FreeTextEntry5] : Discussed with patient his diagnosis, treatment, alternatives to recommended treatment, risk Vs benefits of treatment and no treatment and alternative treatments.  Lab/other tests: reviewed. Medication: Increase venlafaxine extended release dose to 75 mg daily. Side effects including but not limited to GI side effects, prone to withdrawal symptoms if not consistent with taking the medications and possibility of elevated blood pressure at higher doses and black box warning of SI in patients younger than 24 were discussed Educated patient of importance of remaining abstinent from drugs and alcohol.  Emergency procedures were discussed: pt. educated to call 911 or go to nearest ER for worsening of symptoms/suicidal/homicidal ideation.  Patient requested and preferred to begin individual psychotherapy as soon as possible RTC in 3-4 weeks or earlier as needed  Patient given opportunity to ask questions and their questions were answered and they expressed understanding and agreement with above plan.   I stop checked today: Reference #: [id number]  Practitioner Count: 0 Pharmacy Count: 0 Current Opioid Prescriptions: 0 Current Benzodiazepine Prescriptions: 0 Current Stimulant Prescriptions: 0   Patient Demographic Information (PDI)     PDI	First Name	Last Name	Birth Date	Gender	Street Address	City	State	[place]	1995	Male	[address] [facility]	[address]  Prescription Information    PDI Filter:   PDI	My Rx	Current Rx	Drug Type	Rx Written	Rx Dispensed	Drug	Quantity	Days Supply	Prescriber Name	Prescriber TAMIA #	Payment Method	Dispenser A	N	N	B	09/12/2023	09/14/2023	clonazepam 0.5 mg tablet	30	30	Mike Cruz)	[id number]	Insurance	[facility] [id number]   I spent a total of 30 minutes for today's visit in evaluating and treating the patient as per above, including: preparing for patient's appointment (review of prior documents, [facility] ), performing a medically necessary exam/evaluation, communicating/counseling/educating the patient ordering medications, documenting clinical information in the EHR.

## 2024-05-29 ENCOUNTER — APPOINTMENT (OUTPATIENT)
Dept: PHARMACY | Facility: CLINIC | Age: 29
End: 2024-05-29

## 2024-05-29 ENCOUNTER — APPOINTMENT (OUTPATIENT)
Dept: PHARMACY | Facility: CLINIC | Age: 29
End: 2024-05-29
Payer: SELF-PAY

## 2024-05-29 PROCEDURE — V5261C: CUSTOM

## 2024-05-29 NOTE — HISTORY OF PRESENT ILLNESS
[FreeTextEntry1] : 29 yo male referred by ENT for hearing aid evaluation- medical clearance provided. Patient presents with mild to moderate SNHL with good/excellent speech recognition, bilaterally. Patient reports hearing loss since birth. Family hx of hearing loss- maternal side. Patient reports he refused to wear hearing aids when he was a child. Now with increased difficulties including mishearing individual speakers, groups, background noise and television.   [FreeTextEntry8] : Patient seen today for dispensing of new hearing aids.

## 2024-05-29 NOTE — ASSESSMENT
[FreeTextEntry1] : Ears fit: AU Device: Oticon Intent 2 MR-R R SN: [id number] SN: [id number] Domes: 6mm open Receivers: 1(85) Repair/replacement warranty: 6/22/27 Trial period: 7/13/24  Programmed hearing aids to most recent audiogram. Performed feedback testing without comments. Fit with 8mm open domes, however patient noted they were uncomfortable when he smiled- dispensed with 6mm open, however counseled on retention. Settings put to DV domes to provide LF gain. Programmed to ACC 3 as per patient's preference. Patient noted improved sound quality and speech clarity in office.  Discussed use, care, and maintenance of hearing aids. Reviewed insertion and removal of hearing aids.  Discussed use of . Paired hearing aids to patient's cell phone. Reviewed use and functionality of direct streaming and Oticon  Nakia. Patient unable to DL nakia as he did not have his apple ID PW- he will DL at home.   Counseled re: realistic expectations, consistent use of devices, and acclimatization period.   Informed of warranties, 45-day trial period, and financial responsibility. Provided copy of purchase agreement.

## 2024-05-30 ENCOUNTER — APPOINTMENT (OUTPATIENT)
Dept: TRANSGENDER CARE | Facility: CLINIC | Age: 29
End: 2024-05-30
Payer: COMMERCIAL

## 2024-05-30 PROCEDURE — 90791 PSYCH DIAGNOSTIC EVALUATION: CPT | Mod: 95

## 2024-05-31 ENCOUNTER — APPOINTMENT (OUTPATIENT)
Dept: PSYCHIATRY | Facility: CLINIC | Age: 29
End: 2024-05-31
Payer: COMMERCIAL

## 2024-05-31 PROCEDURE — 90834 PSYTX W PT 45 MINUTES: CPT | Mod: 95

## 2024-06-03 ENCOUNTER — NON-APPOINTMENT (OUTPATIENT)
Age: 29
End: 2024-06-03

## 2024-06-03 ENCOUNTER — APPOINTMENT (OUTPATIENT)
Dept: PSYCHIATRY | Facility: CLINIC | Age: 29
End: 2024-06-03
Payer: COMMERCIAL

## 2024-06-03 PROCEDURE — 90834 PSYTX W PT 45 MINUTES: CPT | Mod: 95

## 2024-06-03 NOTE — PLAN
[FreeTextEntry2] : 1. Improved anxiety management. \par  2. Improved mood and self esteem.  [Cognitive and/or Behavior Therapy] : Cognitive and/or Behavior Therapy  [Norton Therapy] : Norton Therapy  [de-identified] : Patient presented as oriented times three and with stable mood. No SI or HI. Session focus negative thinking. CBT work done to help integrate positive self-talk and re-frame perspective and negative thoughts with self. Patient responded well to same. Further work to continue in this area.  Session was virtual for today. [Recommended Frequency of Visits: ____] : Recommended frequency of visits: [unfilled] [Return in ____ week(s)] : Return in [unfilled] week(s) [FreeTextEntry1] : Client ot attend weekly sessions to focus on tx goals and develop healthier sense of self in desired areas of focus.

## 2024-06-03 NOTE — PLAN
[FreeTextEntry2] : 1. Improved anxiety management. \par  2. Improved mood and self esteem.  [Cognitive and/or Behavior Therapy] : Cognitive and/or Behavior Therapy  [Voss Therapy] : Voss Therapy  [de-identified] : Patient presented as oriented times three and with stable mood. No SI or HI. Session focus on patient's avoidance of conflict and struggling to also be vulnerable. Chair work done today with part of self that wants to connect vs part of self that wants to avoid. Experiential work allowed client to develop greater sense of self. Client also invited to speak with therapist about concerns, disagreements in psychotherapy that might emerge or address any conflict he feels. Client was able to do so effectively and did not avoid it. Client praised on same. Further worker in this area to continue.  Session was virtual for today. [Recommended Frequency of Visits: ____] : Recommended frequency of visits: [unfilled] [Return in ____ week(s)] : Return in [unfilled] week(s) [FreeTextEntry1] : Client ot attend weekly sessions to focus on tx goals and develop healthier sense of self in desired areas of focus.

## 2024-06-06 ENCOUNTER — APPOINTMENT (OUTPATIENT)
Dept: TRANSGENDER CARE | Facility: CLINIC | Age: 29
End: 2024-06-06
Payer: COMMERCIAL

## 2024-06-06 PROCEDURE — 90834 PSYTX W PT 45 MINUTES: CPT | Mod: 95

## 2024-06-07 ENCOUNTER — APPOINTMENT (OUTPATIENT)
Dept: OTOLARYNGOLOGY | Facility: CLINIC | Age: 29
End: 2024-06-07
Payer: COMMERCIAL

## 2024-06-07 VITALS
HEART RATE: 72 BPM | HEIGHT: 69 IN | SYSTOLIC BLOOD PRESSURE: 123 MMHG | WEIGHT: 172 LBS | BODY MASS INDEX: 25.48 KG/M2 | DIASTOLIC BLOOD PRESSURE: 79 MMHG

## 2024-06-07 DIAGNOSIS — J34.2 DEVIATED NASAL SEPTUM: ICD-10-CM

## 2024-06-07 DIAGNOSIS — K21.9 GASTRO-ESOPHAGEAL REFLUX DISEASE W/OUT ESOPHAGITIS: ICD-10-CM

## 2024-06-07 DIAGNOSIS — M26.609 UNSPECIFIED TEMPOROMANDIBULAR JOINT DISORDER: ICD-10-CM

## 2024-06-07 DIAGNOSIS — J31.0 CHRONIC RHINITIS: ICD-10-CM

## 2024-06-07 PROCEDURE — 99214 OFFICE O/P EST MOD 30 MIN: CPT | Mod: 25

## 2024-06-07 PROCEDURE — 31231 NASAL ENDOSCOPY DX: CPT

## 2024-06-07 RX ORDER — FLUTICASONE PROPIONATE 50 UG/1
50 SPRAY, METERED NASAL DAILY
Qty: 1 | Refills: 10 | Status: ACTIVE | COMMUNITY
Start: 2024-06-07 | End: 1900-01-01

## 2024-06-07 RX ORDER — AZITHROMYCIN 250 MG/1
250 TABLET, FILM COATED ORAL
Qty: 1 | Refills: 1 | Status: ACTIVE | COMMUNITY
Start: 2024-06-07 | End: 1900-01-01

## 2024-06-07 NOTE — HISTORY OF PRESENT ILLNESS
[Nasal Congestion] : nasal congestion [de-identified] : Patient complains of clogged left ear x 2 weeks. He gets intermittent muffled hearing with the clogged sensation. He went to , was told that he has fluid in the ear without infection. Denies nasal congestion, sinus pressure or pain.  [FreeTextEntry1] : 6/7/2024 recent onset of facial pressure Also has had multiple episodes of sinusitis despit FESS.mod  no other nasal or throat complaints [Tinnitus] : no tinnitus [Hearing Loss] : no hearing loss [Ear Fullness] : no ear fullness [Neck Mass] : no neck mass [Cough] : no cough

## 2024-06-07 NOTE — PAST MEDICAL HISTORY
[FreeTextEntry1] : Charles has much medical history related to inner ear issues, noted medical history includes rhinitis, tinnitus, and eustachian tube dysfunction. Additionally, there is a history of hearing loss in Charles's family and he was recently assessed for need of hearing aids as he has reported increased issues with hearing.

## 2024-06-07 NOTE — PSYCHOSOCIAL ASSESSMENT
[All substances negative except as specified below] : All substances negative except as specified below [_____] : Frequency: [unfilled] [Other: _____] : [unfilled] [Competitive and integrated employment] : Competitive and integrated employment [Other people unrelated to client] : Other people unrelated to client [No] : No [FreeTextEntry2] : Charles explains that he is a hard worker and has an ability to stay calm in "customer service" situations [FreeTextEntry3] : Charles reports having a group of friends, speaks about them throughout the visit, but admits that he can be "closed off" and guarded, which his friends also report to him.  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

## 2024-06-07 NOTE — PHYSICAL EXAM
[Well groomed] : well groomed [Cooperative] : cooperative [Constricted] : constricted [Clear] : clear [None Reported] : none reported [Average] : average [WNL] : within normal limits [FreeTextEntry7] : Charles appears to experience preoccupation and ruminating thoughts but they did not interfere with our visit  [FreeTextEntry5] : Charles's comfort level with this LCSW improved throughout the visit

## 2024-06-07 NOTE — DISCUSSION/SUMMARY
[FreeTextEntry8] : Charles is seeking talk therapy to address self-deprecating, racing thoughts and social anxiety that can also be triggered by his understanding/acceptance of his sexual orientation.  Charles is aware that this space is affirming for the LGBTQ+ community and thus will be focused on acceptance and not conversion of these feelings, which he agrees he would benefit from despite being anxious about exploring his feelings.  [FreeTextEntry4] : Charles was offered weekly and bi-weekly treatment frequency options, and agreed to start with weekly sessions.  We discussed his other care team at [facility]'s  practice in [place] and his options for care from an insurance and splitting treatment focus - though we identify that this LCSW and Charles's other therapist will focus on improving insight to his emotions, gaining CBT techniques to assist with reducing his anxiety and building self-esteem.  This LCSW encouraged Charles to discuss his options for his care openly so that he can receive the care that is best suited for him.   Time in: 1:26pm      Time out: 2:01pm Total duration spent face to face, via video: 35 min

## 2024-06-07 NOTE — ASSESSMENT
[FreeTextEntry1] : DNS and Rhinitis poss sinusitis Rx   Steam humidification and hypertonic saline nasal irrigations  Rx- Flonase tx;Hipol[facility]WS  f/u w/ Dr Joseph for chr Sinus issues despite FESS h/o GERD

## 2024-06-07 NOTE — HISTORY OF PRESENT ILLNESS
[FreeTextEntry1] : Hx of outpatient treatment initially in 2020 with first reported psych evaluation as seen in electronic medical record.  Charles has sought care over the last four years to address reported anxiety manifesting through racing/self-deprecating thoughts, difficulty sleeping and some panic attacks.  Charles has not consistently engaged in outpatient care due to reported fear and embarrassment and was not open to taking psychotropic medications that were prescribed during evaluations in 2020 and 2023. Recently though, Charles did start effexor ER prescribed by his ENT due to his ongoing ear/nose/throat/sinus issues that are documented in the chart, which then prompted Charles to return to care with a psychiatric provider. Charles also re-started talk therapy with provider Elroy Vernon LCSW to address social anxiety and gain CBT skills to assist with re-framing and challenging negative thought processes.  Dx from behavioral health visit on 4/23/2024 with Dr. Baez as follows: Assessment  1. Generalized anxiety disorder (300.02) (F41.1)  2. Social anxiety disorder (300.23) (F40.10)  3. Depressive disorder (311) (F32.A)  4. Chronic insomnia (780.52) (F51.04)

## 2024-06-07 NOTE — REASON FOR VISIT
[Patient preference] : as per patient preference [Starting, patient seen in-person within last 6 months] : Telehealth services are being started as patient has seen in-person within last 6 months. [Telehealth (audio & video) - Individual/Group] : This visit was provided via telehealth using real-time 2-way audio visual technology. [Medical Office: ([facility])___] : The provider was located at the medical office in [unfilled]. [Home] : The patient, [unfilled], was located at home, [unfilled], at the time of the visit. [Verbal consent obtained from patient/other participant(s)] : Verbal consent for telehealth/telephonic services obtained from patient/other participant(s) [Other: ___] : [unfilled] [Patient] : Patient [Other Location: e.g. Home (Enter Location, City,State)___] : The patient, [unfilled], was located at [unfilled] at the time of the visit. [FreeTextEntry4] : 1:26pm [FreeTextEntry5] : 2:01pm [FreeTextEntry2] : April, 2024 [FreeTextEntry3] : met with medical doctor in the office  [FreeTextEntry1] : Charles is a 27 y/o, single male who is currently domiciled in a private apartment where he lives with a male roommate.  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  Charles is happy with his current role and feels that it is a better fit for him as he describes the "intense" work that he did while working as a A.    Charles appears guarded throughout the session, and acknowledges some of his discomfort in discussing personal topics but expresses awareness and insight into the need to be more open with himself, to be more open with others.  Charles admits that he is "very selective" with friends and does not easily open up, which is noted by his friends whom share that they want to get closer to him/know more about him.  Charles reports that he does have friends who are part of the LGBTQ+ community, but he struggles with the various labels associated with orientation both romantically and sexually.

## 2024-06-07 NOTE — FAMILY HISTORY
[FreeTextEntry1] : As per Charles, his mother has a history of depression and difficulty regulating her emotions.  Charles denies that he is aware of any professional diagnoses that she has received but has memories of her changes in mood/emotional outbursts, an episode of self-harm that he witnessed during childhood, and remembers a clear message from his father throughout childhood and adolescence to not "upset" his mother with any stressors/feelings.

## 2024-06-10 ENCOUNTER — APPOINTMENT (OUTPATIENT)
Dept: PSYCHIATRY | Facility: CLINIC | Age: 29
End: 2024-06-10

## 2024-06-10 NOTE — REASON FOR VISIT
[Patient preference] : as per patient preference [Continuing, patient seen in-person within last 12 months] : Telehealth services are continuing as patient has been seen in-person within last 12 months. [Telehealth (audio & video) - Individual/Group] : This visit was provided via telehealth using real-time 2-way audio visual technology. [Medical Office: ([facility])___] : The provider was located at the medical office in [unfilled]. [Home] : The patient, [unfilled], was located at home, [unfilled], at the time of the visit. [Verbal consent obtained from patient/other participant(s)] : Verbal consent for telehealth/telephonic services obtained from patient/other participant(s) [FreeTextEntry4] : 1:06pm [FreeTextEntry5] : 1:56pm [FreeTextEntry2] : April, 2024 [Patient] : Patient [FreeTextEntry1] : routine outpatient talk therapy

## 2024-06-10 NOTE — PLAN
[FreeTextEntry2] : Goal(s): Charles will gain insight into himself and preferences in life; Charles will gain acceptance for himself and preferences; Charles will manage his anxiety so that it does not interfere with his job or social/romantic relationships  Objective(s): Charles will utilize session to explore his feelings and express them in a safe space; Charles will learn and utilize CBT skills to challenge negative, fixed beliefs about himself and the greater world; Charles will identify success or barriers to using learned skills during session  Individual talk therapy 1-4x per month with LCSW, duration 8-12 months  [Cognitive and/or Behavior Therapy] : Cognitive and/or Behavior Therapy  [Psychodynamic Therapy] : Psychodynamic Therapy  [Supportive Therapy] : Supportive Therapy [de-identified] : MALIK and Charles met for individual talk therapy via telehealth video visit. Charles was at home during this visit, he consented to this visit and confirmed that there was a private space to speak.  Charles and this LCSW continue to build rapport during this session as this LCSW provides a safe space for Charles to provide more history, in the context of how his symptoms of anxiety have manifested throughout the majority of his life since childhood.  Charles describes his thought process as "disorganized" and admits that his efforts to self-sooth are efforts to avoid "sitting with thoughts", and thus he keeps himself busy, whenever possible. Charles can also identify an ongoing theme of "avoidance", which he notes has been a barrier for him. Charles processes some past family experiences and can identify where some fixed beliefs about expressing his emotions may have come from.  MALIK notes that Charles's comfort level with this LCSW has increased from our first visit and acknowledges Charles's efforts to be honest and open with this writer. We set our visit for next week and explore his options for care with this LCSW and another  provider in [facility].   Time in: 1:06pm     Time out: 1:56pm Total time spent face to face, via video: 50 min [Return in ____ week(s)] : Return in [unfilled] week(s) [FreeTextEntry1] : Charles is encouraged to speak with his other LCSW about concerns for splitting care and is informed he can sign consent with either provider to coordinate care more successfully.  Charles is made aware that from an insurance standpoint, he should avoid scheduling visits on the same day, which he verbalizes understanding.

## 2024-06-12 ENCOUNTER — APPOINTMENT (OUTPATIENT)
Dept: TRANSGENDER CARE | Facility: CLINIC | Age: 29
End: 2024-06-12
Payer: COMMERCIAL

## 2024-06-12 DIAGNOSIS — F51.04 PSYCHOPHYSIOLOGIC INSOMNIA: ICD-10-CM

## 2024-06-12 DIAGNOSIS — F32.A DEPRESSION, UNSPECIFIED: ICD-10-CM

## 2024-06-12 DIAGNOSIS — F40.10 SOCIAL PHOBIA, UNSPECIFIED: ICD-10-CM

## 2024-06-12 PROCEDURE — 90834 PSYTX W PT 45 MINUTES: CPT | Mod: 95

## 2024-06-14 ENCOUNTER — NON-APPOINTMENT (OUTPATIENT)
Age: 29
End: 2024-06-14

## 2024-06-14 ENCOUNTER — APPOINTMENT (OUTPATIENT)
Dept: PHARMACY | Facility: CLINIC | Age: 29
End: 2024-06-14
Payer: SELF-PAY

## 2024-06-14 PROCEDURE — V5299A: CUSTOM

## 2024-06-17 ENCOUNTER — APPOINTMENT (OUTPATIENT)
Dept: PSYCHIATRY | Facility: CLINIC | Age: 29
End: 2024-06-17

## 2024-06-17 ENCOUNTER — NON-APPOINTMENT (OUTPATIENT)
Age: 29
End: 2024-06-17

## 2024-06-17 PROBLEM — F32.A DEPRESSIVE DISORDER: Status: ACTIVE | Noted: 2024-04-23

## 2024-06-17 PROBLEM — F40.10 SOCIAL ANXIETY DISORDER: Status: ACTIVE | Noted: 2020-08-31

## 2024-06-17 PROBLEM — F51.04 CHRONIC INSOMNIA: Status: ACTIVE | Noted: 2020-08-31

## 2024-06-17 NOTE — PLAN
[FreeTextEntry2] : Goal(s): Charles will gain insight into himself and preferences in life; Charles will gain acceptance for himself and preferences; Charles will manage his anxiety so that it does not interfere with his job or social/romantic relationships  Objective(s): Charles will utilize session to explore his feelings and express them in a safe space; Charles will learn and utilize CBT skills to challenge negative, fixed beliefs about himself and the greater world; Charles will identify success or barriers to using learned skills during session  Individual talk therapy 1-4x per month with LCSW, duration 8-12 months  [Cognitive and/or Behavior Therapy] : Cognitive and/or Behavior Therapy  [Supportive Therapy] : Supportive Therapy [de-identified] : MALIK and Charles met for individual talk therapy via telehealth video visit. Charles was at work during this visit, he consented to this visit and confirmed that there was a private space to speak.  Charles and this LCSW continue to build rapport.  Charles reports efforts to be more conscious of negative thought processes to better understand the patterns and triggers, with understanding that many of these thoughts appear to be obsessive in nature.  Charles is able to express some fears that he experiences related to being able to be attentive to friends' needs, being able to "keep up" when distracted easily, and managing the irritability that can come about from feeling "stuck" in his head.  MALIK reviews some mindfulness activities that he can use to practice this skill and apply it in more intense situations.  We also explore how Charles can better verbalize his challenges with concentration and task avoidance to his medication provider to better understand diagnoses.  Charles also sets a goal to consider setting a routine to help him feel more organized. Support and encouragement is provided.  Time in: 12:04pm     Time out: 12:54pm Total time spent face to face, via video: 50 min [Return in ____ week(s)] : Return in [unfilled] week(s)

## 2024-06-17 NOTE — REASON FOR VISIT
[Patient preference] : as per patient preference [Continuing, patient seen in-person within last 12 months] : Telehealth services are continuing as patient has been seen in-person within last 12 months. [Telehealth (audio & video) - Individual/Group] : This visit was provided via telehealth using real-time 2-way audio visual technology. [Medical Office: ([facility])___] : The provider was located at the medical office in [unfilled]. [Other Location: e.g. Home (Enter Location, City,State)___] : The patient, [unfilled], was located at [unfilled] at the time of the visit. [Verbal consent obtained from patient/other participant(s)] : Verbal consent for telehealth/telephonic services obtained from patient/other participant(s) [FreeTextEntry5] : 12:54pm  [FreeTextEntry4] : 12:04pm [Patient] : Patient [FreeTextEntry1] : routine outpatient talk therapy

## 2024-06-19 ENCOUNTER — APPOINTMENT (OUTPATIENT)
Dept: TRANSGENDER CARE | Facility: CLINIC | Age: 29
End: 2024-06-19

## 2024-06-19 ENCOUNTER — APPOINTMENT (OUTPATIENT)
Dept: PSYCHIATRY | Facility: CLINIC | Age: 29
End: 2024-06-19
Payer: COMMERCIAL

## 2024-06-19 PROCEDURE — 90834 PSYTX W PT 45 MINUTES: CPT | Mod: 95

## 2024-06-20 ENCOUNTER — APPOINTMENT (OUTPATIENT)
Dept: TRANSGENDER CARE | Facility: CLINIC | Age: 29
End: 2024-06-20
Payer: COMMERCIAL

## 2024-06-20 VITALS
WEIGHT: 177 LBS | HEART RATE: 74 BPM | TEMPERATURE: 98.7 F | HEIGHT: 69 IN | BODY MASS INDEX: 26.22 KG/M2 | DIASTOLIC BLOOD PRESSURE: 88 MMHG | SYSTOLIC BLOOD PRESSURE: 130 MMHG | OXYGEN SATURATION: 99 %

## 2024-06-20 DIAGNOSIS — R41.840 ATTENTION AND CONCENTRATION DEFICIT: ICD-10-CM

## 2024-06-20 DIAGNOSIS — F41.9 ANXIETY DISORDER, UNSPECIFIED: ICD-10-CM

## 2024-06-20 DIAGNOSIS — R21 RASH AND OTHER NONSPECIFIC SKIN ERUPTION: ICD-10-CM

## 2024-06-20 PROCEDURE — 99215 OFFICE O/P EST HI 40 MIN: CPT

## 2024-06-20 PROCEDURE — G2211 COMPLEX E/M VISIT ADD ON: CPT

## 2024-06-20 RX ORDER — VENLAFAXINE HYDROCHLORIDE 37.5 MG/1
37.5 CAPSULE, EXTENDED RELEASE ORAL
Qty: 90 | Refills: 0 | Status: DISCONTINUED | COMMUNITY
Start: 2023-08-01 | End: 2024-06-20

## 2024-06-20 RX ORDER — CLOTRIMAZOLE AND BETAMETHASONE DIPROPIONATE 10; .5 MG/G; MG/G
1-0.05 CREAM TOPICAL TWICE DAILY
Qty: 1 | Refills: 0 | Status: ACTIVE | COMMUNITY
Start: 2024-06-20 | End: 1900-01-01

## 2024-06-20 RX ORDER — VENLAFAXINE HYDROCHLORIDE 75 MG/1
75 CAPSULE, EXTENDED RELEASE ORAL DAILY
Qty: 90 | Refills: 1 | Status: ACTIVE | COMMUNITY
Start: 2024-06-20 | End: 1900-01-01

## 2024-06-20 NOTE — PLAN
[FreeTextEntry2] : 1. Improved anxiety management. \par  2. Improved mood and self esteem.  [Cognitive and/or Behavior Therapy] : Cognitive and/or Behavior Therapy  [[facility]] : [facility]  [de-identified] : Patient presented as oriented times three and with stable mood. No SI or HI. Client arrived 15 minutes late to session. Clinician did request client to try and find a place that is quieter so therapy can be more effective as it was difficult to hear client again. Client did understand same. Session focus was on second guessing self and doubting self. Supportive therapy provided w/emotional support around same. CBT re-framing utilized with ACT components to help client better center and strengthen sense of self.  Session was virtual for today. Clinician did speak with client as it was seen in his chart he is seeing another psychotherapist. Clinician did explain that is not best practice for client to be seeing two therapists at once and psychoeducation provided on same. Client knows to have a release for clinicians to speak to each other or he may choose one psychotherapist for now.  [Recommended Frequency of Visits: ____] : Recommended frequency of visits: [unfilled] [Return in ____ week(s)] : Return in [unfilled] week(s) [FreeTextEntry1] : Client ot attend weekly sessions to focus on tx goals and develop healthier sense of self in desired areas of focus.

## 2024-06-20 NOTE — PLAN
[FreeTextEntry2] : 1. Improved anxiety management. \par  2. Improved mood and self esteem.  [Cognitive and/or Behavior Therapy] : Cognitive and/or Behavior Therapy  [[facility]] : [facility]  [de-identified] : Patient presented as oriented times three and with stable mood. No SI or HI. Client arrived 15 minutes late to session. Clinician did request client to try and find a place that is quieter so therapy can be more effective as it was difficult to hear client again. Client did understand same. Session focus was on second guessing self and doubting self. Supportive therapy provided w/emotional support around same. CBT re-framing utilized with ACT components to help client better center and strengthen sense of self.  Session was virtual for today. Clinician did speak with client as it was seen in his chart he is seeing another psychotherapist. Clinician did explain that is not best practice for client to be seeing two therapists at once and psychoeducation provided on same. Client knows to have a release for clinicians to speak to each other or he may choose one psychotherapist for now.  [Recommended Frequency of Visits: ____] : Recommended frequency of visits: [unfilled] [Return in ____ week(s)] : Return in [unfilled] week(s) [FreeTextEntry1] : Client ot attend weekly sessions to focus on tx goals and develop healthier sense of self in desired areas of focus.

## 2024-06-20 NOTE — ASSESSMENT
[FreeTextEntry1] : -- Discussed with patient agree with mental health provider that trial of managing anxiety is appropriate prior to trial of stimulant medication for attention issues as comorbid anxiety may make stimulant meds intolerable to patient. He is in agreement. He did not know to increase venlafaxine and will do so now, RX sent. Trial rhodiola rosea if desires to try herbal supplement.  -- Penile rash appears consistent with dermatitis vs fungal infxn, does not appear consistent with syphilis rash or herpes. Trial clotrimazole/betamethasone BID for no more than 10 days, rtc if not improved. Declines STI screening today. The Partnership for [facility] Safe Sex Evidence Based Intervention was applied and a positive reinforcement of safe behavior was provided.

## 2024-06-20 NOTE — END OF VISIT
[Duration of Psychotherapy Visit (minutes spent in synchronous communication): ____] : Duration of Psychotherapy Visit (minutes spent in synchronous communication): [unfilled] [Individual Psychotherapy for 16-37 minutes] : Individual Psychotherapy for 16-37 minutes [Teletherapy Service Provided] : The services provided in this session were delivered via tele-therapy

## 2024-06-20 NOTE — PHYSICAL EXAM
[de-identified] : There are raised erythematous patches of slightly scaly skin scattered across head of penis

## 2024-06-20 NOTE — HISTORY OF PRESENT ILLNESS
[de-identified] : -- Here for f/u -- Continues to endorse issues with focus -- Taking 37.5 mg venlafaxine daily but often forgets  -- Has a very hard time focusing into tasks, spaces out a lot. He is OK starting tasks, but then gets overwhelmed and can't finish them  -- Interested in natural methods as well   Endorses a penile rash on the tip Has been there for 2 weeks Had been to derm a few weeks ago for itchy skin and was told could be eczema or mites, used permethrin which made it worse It is not itchy, it is a little painful Sometimes it waxes and wanes but remains pretty stable, may be getting a bit better Last sexual encounter was a few weeks ago without a condom  No changes to routine otherwise or changes to soaps etc  He is circumcised  There was a blister at the beginning that burst  He has had similar in the past, red rash that goes away

## 2024-06-24 ENCOUNTER — APPOINTMENT (OUTPATIENT)
Dept: PHARMACY | Facility: CLINIC | Age: 29
End: 2024-06-24
Payer: SELF-PAY

## 2024-06-24 PROCEDURE — V5299A: CUSTOM

## 2024-07-03 ENCOUNTER — APPOINTMENT (OUTPATIENT)
Dept: TRANSGENDER CARE | Facility: CLINIC | Age: 29
End: 2024-07-03

## 2024-07-12 ENCOUNTER — APPOINTMENT (OUTPATIENT)
Dept: OTOLARYNGOLOGY | Facility: CLINIC | Age: 29
End: 2024-07-12

## 2024-07-17 ENCOUNTER — APPOINTMENT (OUTPATIENT)
Dept: PHARMACY | Facility: CLINIC | Age: 29
End: 2024-07-17
Payer: SELF-PAY

## 2024-07-17 PROCEDURE — V5299A: CUSTOM

## 2024-07-23 ENCOUNTER — APPOINTMENT (OUTPATIENT)
Dept: TRANSGENDER CARE | Facility: CLINIC | Age: 29
End: 2024-07-23
Payer: COMMERCIAL

## 2024-07-23 DIAGNOSIS — F51.04 PSYCHOPHYSIOLOGIC INSOMNIA: ICD-10-CM

## 2024-07-23 DIAGNOSIS — F40.10 SOCIAL PHOBIA, UNSPECIFIED: ICD-10-CM

## 2024-07-23 DIAGNOSIS — F32.A DEPRESSION, UNSPECIFIED: ICD-10-CM

## 2024-07-23 PROCEDURE — 90834 PSYTX W PT 45 MINUTES: CPT | Mod: 95

## 2024-07-29 NOTE — REASON FOR VISIT
[Patient preference] : as per patient preference [Continuing, patient seen in-person within last 12 months] : Telehealth services are continuing as patient has been seen in-person within last 12 months. [Telehealth (audio & video) - Individual/Group] : This visit was provided via telehealth using real-time 2-way audio visual technology. [Medical Office: ([facility])___] : The provider was located at the medical office in [unfilled]. [Other Location: e.g. Home (Enter Location, City,State)___] : The patient, [unfilled], was located at [unfilled] at the time of the visit. [Verbal consent obtained from patient/other participant(s)] : Verbal consent for telehealth/telephonic services obtained from patient/other participant(s) [Patient] : Patient [FreeTextEntry4] : 1:02pm [FreeTextEntry5] : 1:54pm [FreeTextEntry2] : April, 2024  [FreeTextEntry1] : routine outpatient talk therapy

## 2024-07-29 NOTE — PLAN
[Cognitive and/or Behavior Therapy] : Cognitive and/or Behavior Therapy  [Supportive Therapy] : Supportive Therapy [Other: ____] : [unfilled] [FreeTextEntry2] : Goal(s): Charles will gain insight into himself and preferences in life; Charles will gain acceptance for himself and preferences; Charles will manage his anxiety so that it does not interfere with his job or social/romantic relationships  Objective(s): Charles will utilize session to explore his feelings and express them in a safe space; Charles will learn and utilize CBT skills to challenge negative, fixed beliefs about himself and the greater world; Charles will identify success or barriers to using learned skills during session  Individual talk therapy 1-4x per month with LCSW, duration 8-12 months  [de-identified] : MALIK and Charles met for individual talk therapy via telehealth video visit. Charles was in a private space at work during this visit, he consented to this visit and confirmed that there was a private space to speak.  Charles reports that he is doing "ok", but describes being "busy", both at work and in his personal life as we note some of the time between this and our last session.  He reports that his symptoms have continued to manifest through difficulty sleeping, avoidant behaviors, and struggling to be present and recall needed information.  He reports being able to be more aware of the patterns of his behavior but admits that it remains difficult to change some of these behaviors.  LCSW administered the Adult ADHD Self-Report Scale (ASRS-v1.1) as we had discussed better assessing for these symptoms in our last session.  Results indicate that Charles may in fact have ADHD that is interfering with his ability to organize and complete tasks, remember necessary appointment/obligations and results in experiences of day-dreaming or "zoning out."   He reports some minor improvement of feelings of anxiety with current dose of Effexor.  We discuss how he could go about seeking non-stimulant medication for potential ADHD diagnosis and use of care with the  office in [place] where he met with Dr. Baez, earlier this year.  We briefly review some behavioral changes that Charles may be willing/able to put into place in order to make change with some of the things he is struggling with, including avoidance of dating as he continues to struggle with his sexual orientation. Support and encouragement is offered.   Time in: 1:02pm      Time out:  1:54pm Total time spent face to face, via video: 52 min [Return in ____ week(s)] : Return in [unfilled] week(s) [FreeTextEntry1] : Charles will re-engage in more frequent visits

## 2024-07-30 ENCOUNTER — APPOINTMENT (OUTPATIENT)
Dept: TRANSGENDER CARE | Facility: CLINIC | Age: 29
End: 2024-07-30
Payer: COMMERCIAL

## 2024-07-30 PROCEDURE — 90834 PSYTX W PT 45 MINUTES: CPT | Mod: 95

## 2024-08-05 ENCOUNTER — APPOINTMENT (OUTPATIENT)
Dept: TRANSGENDER CARE | Facility: CLINIC | Age: 29
End: 2024-08-05

## 2024-08-05 PROCEDURE — 90834 PSYTX W PT 45 MINUTES: CPT | Mod: 95

## 2024-08-05 NOTE — PLAN
[Cognitive and/or Behavior Therapy] : Cognitive and/or Behavior Therapy  [Supportive Therapy] : Supportive Therapy [FreeTextEntry2] : Goal(s): Charles will gain insight into himself and preferences in life; Charles will gain acceptance for himself and preferences; Charles will manage his anxiety so that it does not interfere with his job or social/romantic relationships  Objective(s): Charles will utilize session to explore his feelings and express them in a safe space; Charles will learn and utilize CBT skills to challenge negative, fixed beliefs about himself and the greater world; Charles will identify success or barriers to using learned skills during session  Individual talk therapy 1-4x per month with LCSW, duration 8-12 months  [Psychodynamic Therapy] : Psychodynamic Therapy  [de-identified] : MALIK and Charles met for individual talk therapy via telehealth video visit. Charles was at work on his break, during this visit, he consented to this visit and confirmed that there was a private space to speak.  Charles reports some efforts to use social cues and mindfulness to work to be more present when having conversations with others, mostly in the social space.  He takes time to identify some additional behavioral patterns that he is more aware of and we explore how these patterns have been present throughout his life, with origin likely from his upbringing.  MALIK and Charles explore "avoidance" as a coping skill as he identifies that this is his biggest concern because it is a behavior that impacts both his socialization and some work efforts.  Charles takes some more time to explore the avoidance of his sexuality, as it relates to disclosing his feelings to his parents and how this has impacted a relationship in the past.  We identify some concrete steps that Charles may be willing to take as it relates to understanding how avoidant behaviors impact his ability to be himself. Support and encouragement is offered.    Time in:  1:07pm    Time out: 1:55pm Total time spent face to face, via video: 48 min [Return in ____ week(s)] : Return in [unfilled] week(s)

## 2024-08-05 NOTE — PLAN
[Cognitive and/or Behavior Therapy] : Cognitive and/or Behavior Therapy  [Supportive Therapy] : Supportive Therapy [FreeTextEntry2] : Goal(s): Charles will gain insight into himself and preferences in life; Charles will gain acceptance for himself and preferences; Charles will manage his anxiety so that it does not interfere with his job or social/romantic relationships  Objective(s): Charles will utilize session to explore his feelings and express them in a safe space; Charles will learn and utilize CBT skills to challenge negative, fixed beliefs about himself and the greater world; Charles will identify success or barriers to using learned skills during session  Individual talk therapy 1-4x per month with LCSW, duration 8-12 months  [Psychodynamic Therapy] : Psychodynamic Therapy  [de-identified] : MALIK and Charles met for individual talk therapy via telehealth video visit. Charles was at work on his break, during this visit, he consented to this visit and confirmed that there was a private space to speak.  Charles reports some efforts to use social cues and mindfulness to work to be more present when having conversations with others, mostly in the social space.  He takes time to identify some additional behavioral patterns that he is more aware of and we explore how these patterns have been present throughout his life, with origin likely from his upbringing.  MALIK and Charles explore "avoidance" as a coping skill as he identifies that this is his biggest concern because it is a behavior that impacts both his socialization and some work efforts.  Charles takes some more time to explore the avoidance of his sexuality, as it relates to disclosing his feelings to his parents and how this has impacted a relationship in the past.  We identify some concrete steps that Charles may be willing to take as it relates to understanding how avoidant behaviors impact his ability to be himself. Support and encouragement is offered.    Time in:  1:07pm    Time out: 1:55pm Total time spent face to face, via video: 48 min [Return in ____ week(s)] : Return in [unfilled] week(s)

## 2024-08-05 NOTE — REASON FOR VISIT
[Patient preference] : as per patient preference [Continuing, patient seen in-person within last 12 months] : Telehealth services are continuing as patient has been seen in-person within last 12 months. [Telehealth (audio & video) - Individual/Group] : This visit was provided via telehealth using real-time 2-way audio visual technology. [Medical Office: ([facility])___] : The provider was located at the medical office in [unfilled]. [Home] : The patient, [unfilled], was located at home, [unfilled], at the time of the visit. [Verbal consent obtained from patient/other participant(s)] : Verbal consent for telehealth/telephonic services obtained from patient/other participant(s) [Patient] : Patient [Other Location: e.g. Home (Enter Location, City,State)___] : The patient, [unfilled], was located at [unfilled] at the time of the visit. [FreeTextEntry4] : 1:07pm [FreeTextEntry5] : 1:55pm [FreeTextEntry1] : routine outpatient talk therapy

## 2024-08-05 NOTE — REASON FOR VISIT
[Patient preference] : as per patient preference [Continuing, patient seen in-person within last 12 months] : Telehealth services are continuing as patient has been seen in-person within last 12 months. [Telehealth (audio & video) - Individual/Group] : This visit was provided via telehealth using real-time 2-way audio visual technology. [Medical Office: ([facility])___] : The provider was located at the medical office in [unfilled]. [Home] : The patient, [unfilled], was located at home, [unfilled], at the time of the visit. [Verbal consent obtained from patient/other participant(s)] : Verbal consent for telehealth/telephonic services obtained from patient/other participant(s) [Patient] : Patient [Other Location: e.g. Home (Enter Location, City,State)___] : The patient, [unfilled], was located at [unfilled] at the time of the visit. [FreeTextEntry4] : 1:07pm [FreeTextEntry5] : 1:55pm [FreeTextEntry1] : routine outpatient talk therapy

## 2024-08-07 NOTE — REASON FOR VISIT
[Patient preference] : as per patient preference [Continuing, patient seen in-person within last 12 months] : Telehealth services are continuing as patient has been seen in-person within last 12 months. [Telehealth (audio & video) - Individual/Group] : This visit was provided via telehealth using real-time 2-way audio visual technology. [Other Location: e.g. Home (Enter Location, City,State)___] : The provider was located at [unfilled]. [Home] : The patient, [unfilled], was located at home, [unfilled], at the time of the visit. [Verbal consent obtained from patient/other participant(s)] : Verbal consent for telehealth/telephonic services obtained from patient/other participant(s) [Patient] : Patient [FreeTextEntry4] : 10:47am [FreeTextEntry5] : 11:34am [FreeTextEntry1] : routine outpatient talk therapy

## 2024-08-07 NOTE — PLAN
[Cognitive and/or Behavior Therapy] : Cognitive and/or Behavior Therapy  [Supportive Therapy] : Supportive Therapy [Psychodynamic Therapy] : Psychodynamic Therapy  [FreeTextEntry2] : Goal(s): Charles will gain insight into himself and preferences in life; Charles will gain acceptance for himself and preferences; Charles will manage his anxiety so that it does not interfere with his job or social/romantic relationships  Objective(s): Charles will utilize session to explore his feelings and express them in a safe space; Charles will learn and utilize CBT skills to challenge negative, fixed beliefs about himself and the greater world; Charles will identify success or barriers to using learned skills during session  Individual talk therapy 1-4x per month with LCSW, duration 8-12 months  [de-identified] : MALIK and Charles met for individual talk therapy via telehealth video visit. Charles was at home during this visit, he consented to this visit and confirmed that there was a private space to speak there.  Charles continues to put effort into reflecting on his symptoms and patterns of behavior to gain insight in order to make change, but appears ambivalent about some change, specifically with potentially seeking out assessment and medication for attention/concentration issues.  He can better describe how his anxiety has manifested and further explores some of the past experiences during childhood that likely contribute to his anxiety in the present.  Charles processes the negative messages that he receives from his father regarding the LGBTQ+ community and how this impacts his thoughts/feelings about his own sexual orientation.  Charles identifies guilt as a driving factor and why he struggles to set some emotional boundaries with his parents.  We explore ways to re-charge and Charles can identify some skills that are helpful to do this.  Support and encouragement are offered.      Time in: 10:47am      Time out: 11:34am  Total time spent face to face, via video: 47 min

## 2024-08-12 ENCOUNTER — APPOINTMENT (OUTPATIENT)
Dept: TRANSGENDER CARE | Facility: CLINIC | Age: 29
End: 2024-08-12
Payer: COMMERCIAL

## 2024-08-12 DIAGNOSIS — F40.10 SOCIAL PHOBIA, UNSPECIFIED: ICD-10-CM

## 2024-08-12 DIAGNOSIS — F32.A DEPRESSION, UNSPECIFIED: ICD-10-CM

## 2024-08-12 DIAGNOSIS — F51.04 PSYCHOPHYSIOLOGIC INSOMNIA: ICD-10-CM

## 2024-08-12 PROCEDURE — 90834 PSYTX W PT 45 MINUTES: CPT | Mod: 95

## 2024-08-15 NOTE — PLAN
[Cognitive and/or Behavior Therapy] : Cognitive and/or Behavior Therapy  [Psychodynamic Therapy] : Psychodynamic Therapy  [Supportive Therapy] : Supportive Therapy [de-identified] : NICW and Charles met for individual talk therapy via telehealth video visit. Charles was at home during this visit, he consented to this visit and confirmed that there was a private space to speak.  Charles presents as engaged and at baseline, with ongoing anxiety, obsessive thought processes and reported challenges with staying organized/focused which impacts his ability to complete certain tasks.  Charles continues to take steps to gain more insight about his behaviors and patterns throughout his life.  He provides some more history as it relates to family dynamics and the "pressure" that he feels in being an only child and the roles that he feels his parents have put on him to support them.  As we explore options for behavioral change, Charles notes where this can be difficult for him due to concern for other's reactions or the potential for conflict, but he notes how he can perseverate and has some insight into how that might be detrimental to him.  We again review what behavioral changes Charles may be willing to start with such as being more "open" with trusted friends and considering re-evaluations with a psychiatric provider for possible update to dx and options for medication. Support and encouragement is offered as Alexa has gained insight through our sessions.   Time in: 1:02pm      Time out: 1:52pm Total time spent face to face, via video: 50 min [Return in ____ week(s)] : Return in [unfilled] week(s)

## 2024-08-15 NOTE — REASON FOR VISIT
[Patient preference] : as per patient preference [Continuing, patient seen in-person within last 12 months] : Telehealth services are continuing as patient has been seen in-person within last 12 months. [Telehealth (audio & video) - Individual/Group] : This visit was provided via telehealth using real-time 2-way audio visual technology. [Medical Office: ([facility])___] : The provider was located at the medical office in [unfilled]. [Home] : The patient, [unfilled], was located at home, [unfilled], at the time of the visit. [Verbal consent obtained from patient/other participant(s)] : Verbal consent for telehealth/telephonic services obtained from patient/other participant(s) [FreeTextEntry4] : 1:02pm [FreeTextEntry5] : 1:52pm [Patient] : Patient [FreeTextEntry1] : routine outpatient talk therapy

## 2024-08-20 ENCOUNTER — APPOINTMENT (OUTPATIENT)
Dept: TRANSGENDER CARE | Facility: CLINIC | Age: 29
End: 2024-08-20

## 2024-08-29 ENCOUNTER — APPOINTMENT (OUTPATIENT)
Dept: TRANSGENDER CARE | Facility: CLINIC | Age: 29
End: 2024-08-29

## 2024-08-29 PROCEDURE — 90834 PSYTX W PT 45 MINUTES: CPT | Mod: 95

## 2024-09-05 ENCOUNTER — NON-APPOINTMENT (OUTPATIENT)
Age: 29
End: 2024-09-05

## 2024-09-09 ENCOUNTER — APPOINTMENT (OUTPATIENT)
Dept: TRANSGENDER CARE | Facility: CLINIC | Age: 29
End: 2024-09-09
Payer: COMMERCIAL

## 2024-09-09 DIAGNOSIS — F32.A DEPRESSION, UNSPECIFIED: ICD-10-CM

## 2024-09-09 DIAGNOSIS — F40.10 SOCIAL PHOBIA, UNSPECIFIED: ICD-10-CM

## 2024-09-09 PROCEDURE — 90834 PSYTX W PT 45 MINUTES: CPT | Mod: 95

## 2024-09-10 ENCOUNTER — APPOINTMENT (OUTPATIENT)
Dept: RADIOLOGY | Facility: CLINIC | Age: 29
End: 2024-09-10
Payer: COMMERCIAL

## 2024-09-10 PROCEDURE — 71110 X-RAY EXAM RIBS BIL 3 VIEWS: CPT

## 2024-09-11 NOTE — REASON FOR VISIT
[Patient preference] : as per patient preference [Continuing, patient seen in-person within last 12 months] : Telehealth services are continuing as patient has been seen in-person within last 12 months. [Telehealth (audio & video) - Individual/Group] : This visit was provided via telehealth using real-time 2-way audio visual technology. [Medical Office: ([facility])___] : The provider was located at the medical office in [unfilled]. [Home] : The patient, [unfilled], was located at home, [unfilled], at the time of the visit. [Patient's space is appropriate for telehealth and maintains privacy/confidentiality.] : Patient's space is appropriate for telehealth and maintains privacy/confidentiality. [Participant(s) identity verified] : Participant(s) identity verified. [Verbal consent obtained from patient/other participant(s)] : Verbal consent for telehealth/telephonic services obtained from patient/other participant(s) [FreeTextEntry4] : 1:12pm [FreeTextEntry5] : 2:01pm [Patient] : Patient [FreeTextEntry1] : routine outpatient talk therapy

## 2024-09-11 NOTE — PLAN
[FreeTextEntry2] : Goal(s): Charles will gain insight into himself and preferences in life; Charles will gain acceptance for himself and preferences; Charles will manage his anxiety so that it does not interfere with his job or social/romantic relationships  Objective(s): Charles will utilize session to explore his feelings and express them in a safe space; Charles will learn and utilize CBT skills to challenge negative, fixed beliefs about himself and the greater world; Charles will identify success or barriers to using learned skills during session  Individual talk therapy 1-4x per month with LCSW, duration 8-12 months  [Cognitive and/or Behavior Therapy] : Cognitive and/or Behavior Therapy  [Supportive Therapy] : Supportive Therapy [de-identified] : NICW and Charles met for individual talk therapy via telehealth video visit. Charles was at home during this visit, he consented to this visit and confirmed that there was a private space to speak.  Charles presents as engaged, he reports that he is doing "ok" as he continues to manage feeling of internalized homophobia, symptoms of anxiety, and challenges with executive functioning that can interfere with work and socializing.  We explore how the mind and body are connected as Charles processes some physical pain that he agrees to monitor for intensity/frequency as it relates to situations that he is anxious about.  We review mindfulness skills and ways to be more aware of his emotions to interrupt increasing panic, as Charles identifies a very specific trigger that he will face next week in small claims court with his previous landlord.  Support and encouragement are provided throughout this visit.   Time in: 1:12pm     Time out:   2:01pm Total time spent face to face, via video: 49 min [Return in ____ week(s)] : Return in [unfilled] week(s)

## 2024-09-23 ENCOUNTER — APPOINTMENT (OUTPATIENT)
Dept: TRANSGENDER CARE | Facility: CLINIC | Age: 29
End: 2024-09-23
Payer: COMMERCIAL

## 2024-09-23 PROCEDURE — 90834 PSYTX W PT 45 MINUTES: CPT | Mod: 95

## 2024-09-25 NOTE — REASON FOR VISIT
[Patient preference] : as per patient preference [Continuing, patient seen in-person within last 12 months] : Telehealth services are continuing as patient has been seen in-person within last 12 months. [Telehealth (audio & video) - Individual/Group] : This visit was provided via telehealth using real-time 2-way audio visual technology. [Medical Office: ([facility])___] : The provider was located at the medical office in [unfilled]. [Home] : The patient, [unfilled], was located at home, [unfilled], at the time of the visit. [Patient's space is appropriate for telehealth and maintains privacy/confidentiality.] : Patient's space is appropriate for telehealth and maintains privacy/confidentiality. [Participant(s) identity verified] : Participant(s) identity verified. [Verbal consent obtained from patient/other participant(s)] : Verbal consent for telehealth/telephonic services obtained from patient/other participant(s) [FreeTextEntry4] : 1:03pm [FreeTextEntry5] : 1:55pm [FreeTextEntry2] : April, 2024 [FreeTextEntry3] : Charles was present for medical care in the office with Dr. Harry  [Patient] : Patient [FreeTextEntry1] : routine outpatient talk therapy

## 2024-09-25 NOTE — PLAN
[FreeTextEntry2] : Goal(s): Charles will gain insight into himself and preferences in life; Charles will gain acceptance for himself and preferences; Charles will manage his anxiety so that it does not interfere with his job or social/romantic relationships  Objective(s): Charles will utilize session to explore his feelings and express them in a safe space; Charles will learn and utilize CBT skills to challenge negative, fixed beliefs about himself and the greater world; Charles will identify success or barriers to using learned skills during session  Individual talk therapy 1-4x per month with LCSW, duration 8-12 months  [Cognitive and/or Behavior Therapy] : Cognitive and/or Behavior Therapy  [Supportive Therapy] : Supportive Therapy [de-identified] : MALIK and Charles met for individual talk therapy via telehealth video visit. Charles was at home during this visit, he consented to this visit and confirmed that there was a private space to speak.  Charles appears distracted during the start of our visit, LCSW re-assesses if Charles is able to fully engage in the session and he apologizes and is able to focus and engage more by reducing other distractions in the space.  He reports anxiety with identifiable triggers, that Charles is aware of; he is able to identify that current medication (Effexor; initially prescribed for pain) is helpful, as without access to the dose over the last week, he notes feeling more "on edge" and anxious.  We again review the value of returning to psychiatry at [facility] to further explore medication options and discuss his symptoms related to executive functioning, but he has not yet followed up with this, and we set a goal for him to reach our office to schedule a follow-up as the next step.  We further explore how his anxiety is triggered by hiding or dismissing his sexual orientation, so we explore how to reduce shame around this through reframing and behavioral changes that allow him to experiment with his comfort level related to this. We also explore how he communicates his wants/needs about relationships in the online dating space.  Support and encouragement are offered, next visit set for 1 week.    Time in: 1:03pm      Time out: 1:55pm Total time spent face to face, via video:  52 min [Return in ____ week(s)] : Return in [unfilled] week(s)

## 2024-09-26 RX ORDER — CLONAZEPAM 0.25 MG/1
0.25 TABLET, ORALLY DISINTEGRATING ORAL
Qty: 12 | Refills: 3 | Status: ACTIVE | COMMUNITY
Start: 2024-09-26 | End: 1900-01-01

## 2024-09-27 ENCOUNTER — NON-APPOINTMENT (OUTPATIENT)
Age: 29
End: 2024-09-27

## 2024-09-30 ENCOUNTER — APPOINTMENT (OUTPATIENT)
Dept: TRANSGENDER CARE | Facility: CLINIC | Age: 29
End: 2024-09-30
Payer: COMMERCIAL

## 2024-09-30 DIAGNOSIS — F40.10 SOCIAL PHOBIA, UNSPECIFIED: ICD-10-CM

## 2024-09-30 DIAGNOSIS — F41.1 GENERALIZED ANXIETY DISORDER: ICD-10-CM

## 2024-09-30 DIAGNOSIS — F32.A DEPRESSION, UNSPECIFIED: ICD-10-CM

## 2024-09-30 PROCEDURE — 90834 PSYTX W PT 45 MINUTES: CPT | Mod: 95

## 2024-10-01 NOTE — REASON FOR VISIT
[Patient preference] : as per patient preference [Continuing, patient seen in-person within last 12 months] : Telehealth services are continuing as patient has been seen in-person within last 12 months. [Telehealth (audio & video) - Individual/Group] : This visit was provided via telehealth using real-time 2-way audio visual technology. [Medical Office: ([facility])___] : The provider was located at the medical office in [unfilled]. [Home] : The patient, [unfilled], was located at home, [unfilled], at the time of the visit. [Patient's space is appropriate for telehealth and maintains privacy/confidentiality.] : Patient's space is appropriate for telehealth and maintains privacy/confidentiality. [Participant(s) identity verified] : Participant(s) identity verified. [Verbal consent obtained from patient/other participant(s)] : Verbal consent for telehealth/telephonic services obtained from patient/other participant(s) [Patient] : Patient [FreeTextEntry4] : 1:08pm [FreeTextEntry5] : 2:00pm [FreeTextEntry2] : April, 2024 [FreeTextEntry3] : met with Dr. Harry for medical care  [FreeTextEntry1] : routine outpatient talk therapy

## 2024-10-01 NOTE — PLAN
[Cognitive and/or Behavior Therapy] : Cognitive and/or Behavior Therapy  [Supportive Therapy] : Supportive Therapy [Return in ____ week(s)] : Return in [unfilled] week(s) [FreeTextEntry2] : Goal(s): Charles will gain insight into himself and preferences in life; Charles will gain acceptance for himself and preferences; Charles will manage his anxiety so that it does not interfere with his job or social/romantic relationships  Objective(s): Charles will utilize session to explore his feelings and express them in a safe space; Charles will learn and utilize CBT skills to challenge negative, fixed beliefs about himself and the greater world; Charles will identify success or barriers to using learned skills during session  Individual talk therapy 1-4x per month with LCSW, duration 8-12 months  [de-identified] : NICW and Charles met for individual talk therapy via telehealth video visit. Charles was at home during this visit, he consented to this visit and confirmed that there was a private space to speak.  Initially, Charles shares that he is doing "ok" and reports some improvement in overall levels of anxiety compared to the week prior, but as the visit goes on, he describes ways in which his anxiety has in fact increased.  Charles reports inner ear pressure/pain/discomfort that he describes similar to his ears "popping", which he notes increased during this week; his ENT provider did increase his dose of effexor but he has not yet felt any improvement.  He reports symptoms of feeling "hyper-focused" on how he is feeling physically which has interfered with him being able to be present at work or socially with friends, noting that he isolated more this week.   We explore how his physical health and mental health may be exacerbating the other, exploring the bio-psycho-social model of care.  We review skills for meditation/mindfulness, as well as Charles creating some routines for himself at home to "wind down" so that he can get better quality of sleep.  Again, Charles is encouraged to consider returning to [facility] to explore these symptoms further with a psychiatrist to explore additional options for medication; he remains ambivalent about this due to feelings of embarrassment in the treatment space. Support and encouragement are provided.   Time in:  1:08pm     Time out:   2:00pm Total time spent face to face, via video: 52 min [FreeTextEntry1] : We agree to reassess our frequency of visits during our next session

## 2024-10-02 ENCOUNTER — APPOINTMENT (OUTPATIENT)
Dept: PHARMACY | Facility: CLINIC | Age: 29
End: 2024-10-02